# Patient Record
Sex: FEMALE | Race: BLACK OR AFRICAN AMERICAN | Employment: UNEMPLOYED | URBAN - METROPOLITAN AREA
[De-identification: names, ages, dates, MRNs, and addresses within clinical notes are randomized per-mention and may not be internally consistent; named-entity substitution may affect disease eponyms.]

---

## 2017-01-10 ENCOUNTER — HOSPITAL ENCOUNTER (EMERGENCY)
Age: 27
Discharge: HOME | End: 2017-01-10
Attending: EMERGENCY MEDICINE
Payer: COMMERCIAL

## 2017-01-10 ENCOUNTER — APPOINTMENT (OUTPATIENT)
Dept: GENERAL RADIOLOGY | Age: 27
End: 2017-01-10
Attending: PHYSICIAN ASSISTANT
Payer: COMMERCIAL

## 2017-01-10 VITALS
SYSTOLIC BLOOD PRESSURE: 140 MMHG | WEIGHT: 194.89 LBS | DIASTOLIC BLOOD PRESSURE: 87 MMHG | RESPIRATION RATE: 20 BRPM | BODY MASS INDEX: 32.47 KG/M2 | OXYGEN SATURATION: 94 % | TEMPERATURE: 97.9 F | HEART RATE: 92 BPM | HEIGHT: 65 IN

## 2017-01-10 DIAGNOSIS — R11.2 NON-INTRACTABLE VOMITING WITH NAUSEA, UNSPECIFIED VOMITING TYPE: ICD-10-CM

## 2017-01-10 DIAGNOSIS — K52.9 GASTROENTERITIS: Primary | ICD-10-CM

## 2017-01-10 LAB
ALBUMIN SERPL BCP-MCNC: 4 G/DL (ref 3.5–5)
ALBUMIN/GLOB SERPL: 0.8 {RATIO} (ref 1.1–2.2)
ALP SERPL-CCNC: 128 U/L (ref 45–117)
ALT SERPL-CCNC: 27 U/L (ref 12–78)
ANION GAP BLD CALC-SCNC: 6 MMOL/L (ref 5–15)
APPEARANCE UR: ABNORMAL
AST SERPL W P-5'-P-CCNC: 28 U/L (ref 15–37)
BACTERIA URNS QL MICRO: NEGATIVE /HPF
BASOPHILS # BLD AUTO: 0 K/UL (ref 0–0.1)
BASOPHILS # BLD: 0 % (ref 0–1)
BILIRUB SERPL-MCNC: 0.6 MG/DL (ref 0.2–1)
BILIRUB UR QL: NEGATIVE
BUN SERPL-MCNC: 7 MG/DL (ref 6–20)
BUN/CREAT SERPL: 7 (ref 12–20)
CALCIUM SERPL-MCNC: 9.2 MG/DL (ref 8.5–10.1)
CHLORIDE SERPL-SCNC: 106 MMOL/L (ref 97–108)
CO2 SERPL-SCNC: 30 MMOL/L (ref 21–32)
COLOR UR: ABNORMAL
CREAT SERPL-MCNC: 1 MG/DL (ref 0.55–1.02)
EOSINOPHIL # BLD: 0 K/UL (ref 0–0.4)
EOSINOPHIL NFR BLD: 0 % (ref 0–7)
EPITH CASTS URNS QL MICRO: ABNORMAL /LPF
ERYTHROCYTE [DISTWIDTH] IN BLOOD BY AUTOMATED COUNT: 15.3 % (ref 11.5–14.5)
GLOBULIN SER CALC-MCNC: 5.2 G/DL (ref 2–4)
GLUCOSE SERPL-MCNC: 102 MG/DL (ref 65–100)
GLUCOSE UR STRIP.AUTO-MCNC: NEGATIVE MG/DL
HCG UR QL: NEGATIVE
HCT VFR BLD AUTO: 39.6 % (ref 35–47)
HGB BLD-MCNC: 13.2 G/DL (ref 11.5–16)
HGB UR QL STRIP: ABNORMAL
HYALINE CASTS URNS QL MICRO: ABNORMAL /LPF (ref 0–5)
KETONES UR QL STRIP.AUTO: NEGATIVE MG/DL
LEUKOCYTE ESTERASE UR QL STRIP.AUTO: NEGATIVE
LIPASE SERPL-CCNC: 130 U/L (ref 73–393)
LYMPHOCYTES # BLD AUTO: 8 % (ref 12–49)
LYMPHOCYTES # BLD: 0.5 K/UL (ref 0.8–3.5)
MCH RBC QN AUTO: 26.7 PG (ref 26–34)
MCHC RBC AUTO-ENTMCNC: 33.3 G/DL (ref 30–36.5)
MCV RBC AUTO: 80 FL (ref 80–99)
MONOCYTES # BLD: 0.2 K/UL (ref 0–1)
MONOCYTES NFR BLD AUTO: 3 % (ref 5–13)
NEUTS SEG # BLD: 6 K/UL (ref 1.8–8)
NEUTS SEG NFR BLD AUTO: 89 % (ref 32–75)
NITRITE UR QL STRIP.AUTO: NEGATIVE
PH UR STRIP: 7 [PH] (ref 5–8)
PLATELET # BLD AUTO: 176 K/UL (ref 150–400)
POTASSIUM SERPL-SCNC: 4 MMOL/L (ref 3.5–5.1)
PROT SERPL-MCNC: 9.2 G/DL (ref 6.4–8.2)
PROT UR STRIP-MCNC: 100 MG/DL
RBC # BLD AUTO: 4.95 M/UL (ref 3.8–5.2)
RBC #/AREA URNS HPF: ABNORMAL /HPF (ref 0–5)
RBC MORPH BLD: ABNORMAL
SODIUM SERPL-SCNC: 142 MMOL/L (ref 136–145)
SP GR UR REFRACTOMETRY: 1.01 (ref 1–1.03)
UA: UC IF INDICATED,UAUC: ABNORMAL
UROBILINOGEN UR QL STRIP.AUTO: 0.2 EU/DL (ref 0.2–1)
WBC # BLD AUTO: 6.7 K/UL (ref 3.6–11)
WBC URNS QL MICRO: ABNORMAL /HPF (ref 0–4)

## 2017-01-10 PROCEDURE — 81001 URINALYSIS AUTO W/SCOPE: CPT | Performed by: EMERGENCY MEDICINE

## 2017-01-10 PROCEDURE — 74020 XR ABD FLAT/ ERECT: CPT

## 2017-01-10 PROCEDURE — 96361 HYDRATE IV INFUSION ADD-ON: CPT

## 2017-01-10 PROCEDURE — 74011250636 HC RX REV CODE- 250/636: Performed by: PHYSICIAN ASSISTANT

## 2017-01-10 PROCEDURE — 74011000250 HC RX REV CODE- 250: Performed by: PHYSICIAN ASSISTANT

## 2017-01-10 PROCEDURE — 99284 EMERGENCY DEPT VISIT MOD MDM: CPT

## 2017-01-10 PROCEDURE — 83690 ASSAY OF LIPASE: CPT

## 2017-01-10 PROCEDURE — 74011250637 HC RX REV CODE- 250/637: Performed by: PHYSICIAN ASSISTANT

## 2017-01-10 PROCEDURE — 85025 COMPLETE CBC W/AUTO DIFF WBC: CPT | Performed by: EMERGENCY MEDICINE

## 2017-01-10 PROCEDURE — 80053 COMPREHEN METABOLIC PANEL: CPT | Performed by: EMERGENCY MEDICINE

## 2017-01-10 PROCEDURE — 36415 COLL VENOUS BLD VENIPUNCTURE: CPT | Performed by: EMERGENCY MEDICINE

## 2017-01-10 PROCEDURE — 96374 THER/PROPH/DIAG INJ IV PUSH: CPT

## 2017-01-10 PROCEDURE — 81025 URINE PREGNANCY TEST: CPT | Performed by: EMERGENCY MEDICINE

## 2017-01-10 RX ORDER — SODIUM CHLORIDE 0.9 % (FLUSH) 0.9 %
5-10 SYRINGE (ML) INJECTION AS NEEDED
Status: DISCONTINUED | OUTPATIENT
Start: 2017-01-10 | End: 2017-01-10 | Stop reason: HOSPADM

## 2017-01-10 RX ORDER — AZATHIOPRINE 50 MG/1
50 TABLET ORAL DAILY
COMMUNITY
End: 2021-03-15

## 2017-01-10 RX ORDER — ONDANSETRON 4 MG/1
4 TABLET, ORALLY DISINTEGRATING ORAL
Status: DISCONTINUED | OUTPATIENT
Start: 2017-01-10 | End: 2017-01-10

## 2017-01-10 RX ORDER — RANITIDINE HCL 75 MG
75 TABLET ORAL 2 TIMES DAILY
Qty: 14 TAB | Refills: 0 | Status: SHIPPED | OUTPATIENT
Start: 2017-01-10 | End: 2021-03-15

## 2017-01-10 RX ORDER — ONDANSETRON 2 MG/ML
4 INJECTION INTRAMUSCULAR; INTRAVENOUS
Status: COMPLETED | OUTPATIENT
Start: 2017-01-10 | End: 2017-01-10

## 2017-01-10 RX ORDER — ONDANSETRON 4 MG/1
4 TABLET, FILM COATED ORAL
Qty: 15 TAB | Refills: 0 | Status: SHIPPED | OUTPATIENT
Start: 2017-01-10 | End: 2018-09-29

## 2017-01-10 RX ORDER — SODIUM CHLORIDE 0.9 % (FLUSH) 0.9 %
5-10 SYRINGE (ML) INJECTION EVERY 8 HOURS
Status: DISCONTINUED | OUTPATIENT
Start: 2017-01-10 | End: 2017-01-10 | Stop reason: HOSPADM

## 2017-01-10 RX ADMIN — SODIUM CHLORIDE 1000 ML: 900 INJECTION, SOLUTION INTRAVENOUS at 14:55

## 2017-01-10 RX ADMIN — LIDOCAINE HYDROCHLORIDE 40 ML: 20 SOLUTION ORAL; TOPICAL at 15:28

## 2017-01-10 RX ADMIN — ONDANSETRON 4 MG: 2 INJECTION INTRAMUSCULAR; INTRAVENOUS at 14:55

## 2017-01-10 NOTE — ED NOTES
MP Yu visited pt. Discharge orders and instructions noted. Discharge instructions, follow up care and prescriptions reviewed with pt and understanding verbalized. Opportunity for questions and clarifications provided. Pt left via ambulation with family members. In no acute distress.

## 2017-01-10 NOTE — ED NOTES
Patient brought back to triage for re-eval, pt updated on plan of care; pt reports pain continues with nausea

## 2017-01-10 NOTE — Clinical Note
[facility] [address] [place] [address] Physicians [phone] Nora Lofts, MD Cecillia Cera, MD Ritta Scheuermann, MD 
 
[facility] [phone] Diamond Hoffman, P MD Marco Tilley MD Pinkie Levers, MD [phone] MD Jean Romero, MD  
 
[facility] [phone] MD Mateusz Jones, MD Jeffrey Triplett MD [facility] [phone] Morton Opitz, MD Delmar Colder, MD Parish Crowell, NP [address] [phone] MD Laura Orellana MD Darrol Coco, MD Juhi Rendon, MD Yuniel Zimmerman, MD Amanda Bermudez, MD Leandra Young, [address] [place] [address] [phone] Cady Knowles MD 
 
[facility] [phone] MD Annie Yates, NP Yumi Salazar, MD Steph Stoddard MD Faith Memo, MD  
 
[facility] [phone] Cece Rodas, MD Danitza Quintanilla, P Loretta Hurst, NP  
Marylen Delude, MD Gwynne Downy, MD Clifm Beals, MD Lauris Ganja, MD 
 
[facility] [phone] MD Verito Huizar MD Earline Sees, MD Benedict Rector, MD Jocelyne Guys, MD  
 
[facility] [phone] MD Sarwat Godinez MD 
 
 [facility] [phone] MD Moo Deleon MD Shelton Payment, MD  
 
[facility] [phone] Malini Cadet, MD  
Soraya Marcos, MD  
MD Hamilton Weiner MD Lennon Birchwood, MD Marlynn Fiddler, SHYAM Zhao MD  
 
[phone] MD Roseanna Reynolds MD [facility] MD John Johns, DALLIN Rodriguez FNP Glyn Basques, PA-C Princeton Pack, MD Dory Antonio, SHYAM Hodge, [facility] Departments For adult and child immunizations, family planning, TB screening, STD testing and women's health services. [place]: 
[place]  [phone] [place]  [phone] [address] [place] [address]  [phone] [place]   [phone] J[facility]   [phone] [place]: 
[facility]  [phone] [place] [phone] [place]  [phone] [address]  For primary care servi [place], woman and child wellness, and some clinics providing specialty care. [facility] -- [facility] [facility].  
[address] [place] [address]  [phone] [facility] 200 [facility] [phone] [facility] [address]   [phone] [address] [place] [address] Drive [address] [place] [address]    [phone] [facility] [address]  [phone] [address]  [phone] Count includes the [facility] [address], [address] [place] [address] [facility]: 
[facility] [address], ext [phone] [facility], [address] [phone] [place] [address] [phone] [place] [address] Mindy [facility] [address], [phone] [facility]8 [facility] ([email or web address]) [phone] Sexual Health/Woman Wellness Clinics For STD/HIV testing and treatment, pregnancy testing and services, men's health, birth cont rol services, LGBT services, and hepatitis/HPV vaccine services. Brennon [facility] Belinda for [place] [employer] [address] [phone] [facility] of [address]  [phone] [address] [place] [phone] [facility]' 401 [facility], 5th floor [phone] [facility] of [facility] 427 [facility] [address] [facility] [address]. Ramiro Garvey [phone] Specialty Service Clinics 4200 [facility] [phone] 6655 [facility]   [phone] [employer]   [phone] Women, Infant and Children's Services:  
[address] [phone] [address] [phone] 6161 [facility]- [phone] [address] [facility] Psych[facility]     [phone] 1325 [facility]   [phone] 562 [facility] [phone] Miscellaneous: Thank you for allowing us to provide y ou with excellent care today. We hope we addressed all of your concerns and needs. We strive to provide excellent quality care in the Emergency Department. Please rate us as excellent, as anything less than excellent does not meet our expectations. If  you feel that you have not received excellent quality care or timely care, please ask to speak to the nurse manager. Please choose us in the future for your continued health care needs. The exam and treatment you received in the Emergency Department  were for an urgent problem and are not intended as complete care. It is important that you follow up with a doctor, nurse practitioner, or physician assistant for ongoing care.  If your symptoms become worse or you do not improve as expected and you are u delores to reach your usual health care provider, you should return to the Emergency Department. We are available 24 hours a day. Take this sheet with you when you go to your follow-up visit. If you have any problem arranging the follow-up visit, conta ct the Emergency Department immediately. If a prescription has been provided, please have it filled as soon as possible to avoid a delay in treatment. Read the entire medication instruction sheet provided to you by the pharmacy. If you have any questio ns or reservations about taking the medication due to side effects or interactions with other medications please call the ER or your primary care physician. Take this sheet with you when you go to your follow-up visit. Make an appointment with yo  family doctor or the physician you were referred to for follow-up of this visit, as this is mandatory follow-up. Return to the ER if you are unable to be seen or if you are unable to be seen in a timely manner. If you have any problem arranging th e follow-up visit, contact the Emergency Department immediately.

## 2017-01-10 NOTE — DISCHARGE INSTRUCTIONS
Gastroenteritis: Care Instructions  Your Care Instructions  Gastroenteritis is an illness that may cause nausea, vomiting, and diarrhea. It is sometimes called \"stomach flu. \" It can be caused by bacteria or a virus. You will probably begin to feel better in 1 to 2 days. In the meantime, get plenty of rest and make sure you do not become dehydrated. Dehydration occurs when your body loses too much fluid. Follow-up care is a key part of your treatment and safety. Be sure to make and go to all appointments, and call your doctor if you are having problems. Its also a good idea to know your test results and keep a list of the medicines you take. How can you care for yourself at home? · If your doctor prescribed antibiotics, take them as directed. Do not stop taking them just because you feel better. You need to take the full course of antibiotics. · Drink plenty of fluids to prevent dehydration, enough so that your urine is light yellow or clear like water. Choose water and other caffeine-free clear liquids until you feel better. If you have kidney, heart, or liver disease and have to limit fluids, talk with your doctor before you increase your fluid intake. · Drink fluids slowly, in frequent, small amounts, because drinking too much too fast can cause vomiting. · Begin eating mild foods, such as dry toast, yogurt, applesauce, bananas, and rice. Avoid spicy, hot, or high-fat foods, and do not drink alcohol or caffeine for a day or two. Do not drink milk or eat ice cream until you are feeling better. How to prevent gastroenteritis  · Keep hot foods hot and cold foods cold. · Do not eat meats, dressings, salads, or other foods that have been kept at room temperature for more than 2 hours. · Use a thermometer to check your refrigerator. It should be between 34°F and 40°F.  · Defrost meats in the refrigerator or microwave, not on the kitchen counter. · Keep your hands and your kitchen clean.  Wash your hands, cutting boards, and countertops with hot soapy water frequently. · Cook meat until it is well done. · Do not eat raw eggs or uncooked sauces made with raw eggs. · Do not take chances. If food looks or tastes spoiled, throw it out. When should you call for help? Call 911 anytime you think you may need emergency care. For example, call if:  · You vomit blood or what looks like coffee grounds. · You passed out (lost consciousness). · You pass maroon or very bloody stools. Call your doctor now or seek immediate medical care if:  · You have severe belly pain. · You have signs of needing more fluids. You have sunken eyes, a dry mouth, and pass only a little dark urine. · You feel like you are going to faint. · You have increased belly pain that does not go away in 1 to 2 days. · You have new or increased nausea, or you are vomiting. · You have a new or higher fever. · Your stools are black and tarlike or have streaks of blood. Watch closely for changes in your health, and be sure to contact your doctor if:  · You are dizzy or lightheaded. · You urinate less than usual, or your urine is dark yellow or brown. · You do not feel better with each day that goes by. Where can you learn more? Go to http://brown-katie.info/. Enter N142 in the search box to learn more about \"Gastroenteritis: Care Instructions. \"  Current as of: May 24, 2016  Content Version: 11.1  © 2891-5942 [employer]. Care instructions adapted under license by [employer] (which disclaims liability or warranty for this information). If you have questions about a medical condition or this instruction, always ask your healthcare professional. [place] [address] any warranty or liability for your use of this information. Thank you for allowing us to provide you with excellent care today. We hope we addressed all of your concerns and needs.  We strive to provide excellent quality care in the Emergency Department. Please rate us as excellent, as anything less than excellent does not meet our expectations. If you feel that you have not received excellent quality care or timely care, please ask to speak to the nurse manager. Please choose us in the future for your continued health care needs. The exam and treatment you received in the Emergency Department were for an urgent problem and are not intended as complete care. It is important that you follow-up with a doctor, nurse practitioner, or physician assistant to:  (1) confirm your diagnosis,  (2) re-evaluation of changes in your illness and treatment, and  (3) for ongoing care. If your symptoms become worse or you do not improve as expected and you are unable to reach your usual health care provider, you should return to the Emergency Department. We are available 24 hours a day. Take this sheet with you when you go to your follow-up visit. If you have any problem arranging the follow-up visit, contact [address] [place] [address] 21 [phone])    Make an appointment with your Primary Care doctor for follow up of this visit. Return to the ER if you are unable to be seen in the time recommended on your discharge instructions.

## 2017-01-10 NOTE — ED PROVIDER NOTES
HPI Comments: Debra Leon is a 31 y.o. female with a hx of sarcoidosis presenting to the ED from home c/o mid epigastric abdominal that began this AM with nausea and vomiting. Patient states that the pain is localized to her mid epigastric area and does not radiate anywhere else. She reports that she has vomited several times this AM, with the emesis a yellowish clear color. She denies diarrhea, but reports that she has not had a bowel movement for the past 4 days. She reports that she has had pain like this in the past, stating that once it was related to her spleen and the other time she was pregnant. She states that her LMP was 12/24. Denies fevers, chills, chest pain, SOB, rash, or headache. Denies recent sick contacts. Patient states the only medication she takes currently is Azathioprine. Patient denies any other symptoms or complaints. There are no other complaints, changes or physical findings at this time. The history is provided by the patient. No  was used. Past Medical History:   Diagnosis Date    Sarcoidosis of lung ([facility] [address]      diagnosed 2005       History reviewed. No pertinent past surgical history. History reviewed. No pertinent family history. Social History     Social History    Marital status: SINGLE     Spouse name: N/A    Number of children: N/A    Years of education: N/A     Occupational History    Not on file. Social History Main Topics    Smoking status: Never Smoker    Smokeless tobacco: Not on file    Alcohol use No    Drug use: No    Sexual activity: Yes     Partners: Male     Birth control/ protection: None     Other Topics Concern    Not on file     Social History Narrative         ALLERGIES: Review of patient's allergies indicates no known allergies. Review of Systems   Constitutional: Negative for chills and fever. HENT: Negative for sore throat. Eyes: Negative for pain.    Respiratory: Negative for cough and shortness of breath. Cardiovascular: Negative for chest pain. Gastrointestinal: Positive for abdominal pain (mid epigastric), nausea and vomiting. Negative for diarrhea. Genitourinary: Negative for difficulty urinating, dysuria and hematuria. Musculoskeletal: Negative for arthralgias and myalgias. Skin: Negative for rash. Neurological: Negative for dizziness, weakness, light-headedness, numbness and headaches. Vitals:    01/10/17 1229 01/10/17 1315   BP: 113/70 140/87   Pulse: 87 92   Resp: 18 20   Temp: 97.9 °F (36.6 °C)    SpO2: 97% 94%   Weight: 88.4 kg (194 lb 14.2 oz)    Height: 5' 5\" (1.651 m)             Physical Exam   Constitutional: She is oriented to person, place, and time. She appears well-developed and well-nourished. No distress. 32 y/o -American    HENT:   Head: Normocephalic and atraumatic. Right Ear: External ear normal.   Left Ear: External ear normal.   Eyes: Conjunctivae and EOM are normal.   Neck: Normal range of motion. Neck supple. Cardiovascular: Normal rate, regular rhythm and normal heart sounds. No murmur heard. Pulmonary/Chest: Effort normal and breath sounds normal. No respiratory distress. She has no wheezes. She has no rales. She exhibits no tenderness. Abdominal: Soft. Bowel sounds are normal. She exhibits no distension and no ascites. There is no rigidity, no rebound, no guarding, no CVA tenderness and negative Infante's sign. Mid epigastric tenderness to deep palpation. Musculoskeletal: Normal range of motion. She exhibits no edema or tenderness. Neurological: She is alert and oriented to person, place, and time. Skin: Skin is warm and dry. No rash noted. She is not diaphoretic. Psychiatric: She has a normal mood and affect. Her behavior is normal. Judgment normal.   Nursing note and vitals reviewed.        MDM  Number of Diagnoses or Management Options  Gastroenteritis:   Non-intractable vomiting with nausea, unspecified vomiting type: Diagnosis management comments: DDx: gastroenteritis, pancreatitis, hepatitis, cholecystitis, cholelithiasis, UTI, nephrolithiasis, gastritis, peptic ulcer disease. 32 y/o AAF presenting with midepigastric pain with vomiting since this AM. She states that she has vomited yellow emesis several times since this AM. Last BM 4 days ago. Exam reassuring with mild mid epigastric tenderness on palpation. Labs reassuring. Patient had no abdominal pain on reevaluation. Discussed results and plan for discharge. Patient agrees to plan. Amount and/or Complexity of Data Reviewed  Clinical lab tests: ordered and reviewed  Tests in the radiology section of CPT®: ordered and reviewed    Critical Care  Total time providing critical care: < 30 minutes    Patient Progress  Patient progress: stable      Procedures    PROGRESS NOTE:  3:38 PM  Reevaluated patient and she is no longer having any abdominal pain. Discussed with patient that her symptoms are likely related to viral gastroenteritis. Will offer supportive treatment. Patient understanding and agrees with plan for discharge once fluids are finished. DISCHARGE NOTE:  4:35 PM  The care plan has been outline with the patient and/or family, who verbally conveyed understanding and agreement. Available results have been reviewed. Patient and/or family understand the follow up plan as outlined and discharge instructions. Should their condition deterioration at any time after discharge the patient agrees to return, follow up sooner than outlined or seek medical assistance at the closest Emergency Room as soon as possible. Questions have been answered.  Discharge instructions and educational information regarding the patient's diagnosis as well a list of reasons why the patient would want to seek immediate medical attention, should their condition change, were reviewed directly with the patient/family     LABS COMPLETED AND REVIEWED:  Recent Results (from the past 12 hour(s))   CBC WITH AUTOMATED DIFF    Collection Time: 01/10/17 12:32 PM   Result Value Ref Range    WBC 6.7 3.6 - 11.0 K/uL    RBC 4.95 3.80 - 5.20 M/uL    HGB 13.2 11.5 - 16.0 g/dL    HCT 39.6 35.0 - 47.0 %    MCV 80.0 80.0 - 99.0 FL    MCH 26.7 26.0 - 34.0 PG    MCHC 33.3 30.0 - 36.5 g/dL    RDW 15.3 (H) 11.5 - 14.5 %    PLATELET 924 432 - 102 K/uL    NEUTROPHILS 89 (H) 32 - 75 %    LYMPHOCYTES 8 (L) 12 - 49 %    MONOCYTES 3 (L) 5 - 13 %    EOSINOPHILS 0 0 - 7 %    BASOPHILS 0 0 - 1 %    ABS. NEUTROPHILS 6.0 1.8 - 8.0 K/UL    ABS. LYMPHOCYTES 0.5 (L) 0.8 - 3.5 K/UL    ABS. MONOCYTES 0.2 0.0 - 1.0 K/UL    ABS. EOSINOPHILS 0.0 0.0 - 0.4 K/UL    ABS. BASOPHILS 0.0 0.0 - 0.1 K/UL    RBC COMMENTS NORMOCYTIC, NORMOCHROMIC     METABOLIC PANEL, COMPREHENSIVE    Collection Time: 01/10/17 12:32 PM   Result Value Ref Range    Sodium 142 136 - 145 mmol/L    Potassium 4.0 3.5 - 5.1 mmol/L    Chloride 106 97 - 108 mmol/L    CO2 30 21 - 32 mmol/L    Anion gap 6 5 - 15 mmol/L    Glucose 102 (H) 65 - 100 mg/dL    BUN 7 6 - 20 MG/DL    Creatinine 1.00 0.55 - 1.02 MG/DL    BUN/Creatinine ratio 7 (L) 12 - 20      GFR est AA >60 >60 ml/min/1.73m2    GFR est non-AA >60 >60 ml/min/1.73m2    Calcium 9.2 8.5 - 10.1 MG/DL    Bilirubin, total 0.6 0.2 - 1.0 MG/DL    ALT 27 12 - 78 U/L    AST 28 15 - 37 U/L    Alk.  phosphatase 128 (H) 45 - 117 U/L    Protein, total 9.2 (H) 6.4 - 8.2 g/dL    Albumin 4.0 3.5 - 5.0 g/dL    Globulin 5.2 (H) 2.0 - 4.0 g/dL    A-G Ratio 0.8 (L) 1.1 - 2.2     LIPASE    Collection Time: 01/10/17 12:32 PM   Result Value Ref Range    Lipase 130 73 - 393 U/L   URINALYSIS W/ REFLEX CULTURE    Collection Time: 01/10/17  1:10 PM   Result Value Ref Range    Color YELLOW/STRAW      Appearance CLOUDY (A) CLEAR      Specific gravity 1.010 1.003 - 1.030      pH (UA) 7.0 5.0 - 8.0      Protein 100 (A) NEG mg/dL    Glucose NEGATIVE  NEG mg/dL    Ketone NEGATIVE  NEG mg/dL    Bilirubin NEGATIVE  NEG      Blood SMALL (A) NEG Urobilinogen 0.2 0.2 - 1.0 EU/dL    Nitrites NEGATIVE  NEG      Leukocyte Esterase NEGATIVE  NEG      WBC 0-4 0 - 4 /hpf    RBC 0-5 0 - 5 /hpf    Epithelial cells FEW FEW /lpf    Bacteria NEGATIVE  NEG /hpf    UA:UC IF INDICATED CULTURE NOT INDICATED BY UA RESULT CNI      Hyaline Cast 0-2 0 - 5 /lpf   HCG URINE, QL    Collection Time: 01/10/17  1:10 PM   Result Value Ref Range    HCG urine, Ql. NEGATIVE  NEG         IMAGING COMPLETED AND REVIEWED:  Abd Xray  FINDINGS: Supine and upright views of the abdomen demonstrate a normal gas  pattern. There is no free intraperitoneal air. No soft tissue masses or  pathologic calcifications are seen. The bones and soft tissues are within normal  limits.     IMPRESSION: Normal abdomen. CLINICAL IMPRESSION:  1. Gastroenteritis    2. Non-intractable vomiting with nausea, unspecified vomiting type        Plan:  Discharge Medication List as of 1/10/2017  4:24 PM      START taking these medications    Details   ondansetron hcl (ZOFRAN, AS HYDROCHLORIDE,) 4 mg tablet Take 1 Tab by mouth every eight (8) hours as needed for Nausea. , Print, Disp-15 Tab, R-0      raNITIdine (ZANTAC) 75 mg tablet Take 1 Tab by mouth two (2) times a day., Print, Disp-14 Tab, R-0         CONTINUE these medications which have NOT CHANGED    Details   azaTHIOprine (IMURAN) 50 mg tablet Take 50 mg by mouth daily. , Historical Med           Follow-up Information     Follow up With Details Comments Contact Info    [facility] EMERGENCY DEPT  As needed, If symptoms worsen [address] [phone]        Return to the closest emergency room or follow up sooner for any deterioration      Thank you for allowing us to provide you with excellent care today. We hope we addressed all of your concerns and needs. We strive to provide excellent quality care in the Emergency Department. Please rate us as excellent, as anything less than excellent does not meet our expectations.      If you feel that you have not received excellent quality care or timely care, please ask to speak to the nurse manager. Please choose us in the future for your continued health care needs. The exam and treatment you received in the Emergency Department were for an urgent problem and are not intended as complete care. It is important that you follow-up with a doctor, nurse practitioner, or physician assistant to:  (1) confirm your diagnosis,  (2) re-evaluation of changes in your illness and treatment, and  (3) for ongoing care. If your symptoms become worse or you do not improve as expected and you are unable to reach your usual health care provider, you should return to the Emergency Department. We are available 24 hours a day. Take this sheet with you when you go to your follow-up visit. If you have any problem arranging the follow-up visit, contact [address] [place] [address] 21 [phone])    Make an appointment with your Primary Care doctor for follow up of this visit. Return to the ER if you are unable to be seen in the time recommended on your discharge instructions.

## 2017-03-08 ENCOUNTER — HOSPITAL ENCOUNTER (EMERGENCY)
Age: 27
Discharge: HOME | End: 2017-03-08
Attending: EMERGENCY MEDICINE
Payer: COMMERCIAL

## 2017-03-08 VITALS
RESPIRATION RATE: 18 BRPM | WEIGHT: 188 LBS | SYSTOLIC BLOOD PRESSURE: 125 MMHG | HEIGHT: 65 IN | DIASTOLIC BLOOD PRESSURE: 69 MMHG | TEMPERATURE: 98.9 F | BODY MASS INDEX: 31.32 KG/M2 | OXYGEN SATURATION: 97 % | HEART RATE: 106 BPM

## 2017-03-08 DIAGNOSIS — J20.9 ACUTE BRONCHITIS, UNSPECIFIED ORGANISM: Primary | ICD-10-CM

## 2017-03-08 PROCEDURE — 77030013140 HC MSK NEB VYRM -A

## 2017-03-08 PROCEDURE — 99283 EMERGENCY DEPT VISIT LOW MDM: CPT

## 2017-03-08 PROCEDURE — 74011636637 HC RX REV CODE- 636/637: Performed by: EMERGENCY MEDICINE

## 2017-03-08 PROCEDURE — 74011000250 HC RX REV CODE- 250: Performed by: EMERGENCY MEDICINE

## 2017-03-08 PROCEDURE — 94640 AIRWAY INHALATION TREATMENT: CPT

## 2017-03-08 RX ORDER — ALBUTEROL SULFATE 90 UG/1
2 AEROSOL, METERED RESPIRATORY (INHALATION)
Qty: 1 INHALER | Refills: 1 | Status: SHIPPED | OUTPATIENT
Start: 2017-03-08 | End: 2021-03-15

## 2017-03-08 RX ORDER — IPRATROPIUM BROMIDE AND ALBUTEROL SULFATE 2.5; .5 MG/3ML; MG/3ML
3 SOLUTION RESPIRATORY (INHALATION)
Status: COMPLETED | OUTPATIENT
Start: 2017-03-08 | End: 2017-03-08

## 2017-03-08 RX ORDER — PREDNISONE 20 MG/1
20 TABLET ORAL DAILY
Qty: 5 TAB | Refills: 0 | Status: SHIPPED | OUTPATIENT
Start: 2017-03-08 | End: 2017-03-13

## 2017-03-08 RX ORDER — HYDROCODONE POLISTIREX AND CHLORPHENIRAMINE POLISTIREX 10; 8 MG/5ML; MG/5ML
5 SUSPENSION, EXTENDED RELEASE ORAL
Qty: 60 ML | Refills: 0 | Status: SHIPPED | OUTPATIENT
Start: 2017-03-08 | End: 2017-08-11

## 2017-03-08 RX ORDER — PREDNISONE 20 MG/1
60 TABLET ORAL
Status: COMPLETED | OUTPATIENT
Start: 2017-03-08 | End: 2017-03-08

## 2017-03-08 RX ADMIN — PREDNISONE 60 MG: 20 TABLET ORAL at 10:34

## 2017-03-08 RX ADMIN — IPRATROPIUM BROMIDE AND ALBUTEROL SULFATE 3 ML: .5; 3 SOLUTION RESPIRATORY (INHALATION) at 10:16

## 2017-03-08 NOTE — DISCHARGE INSTRUCTIONS
Bronchitis: Care Instructions  Your Care Instructions    Bronchitis is inflammation of the bronchial tubes, which carry air to the lungs. The tubes swell and produce mucus, or phlegm. The mucus and inflamed bronchial tubes make you cough. You may have trouble breathing. Most cases of bronchitis are caused by viruses like those that cause colds. Antibiotics usually do not help and they may be harmful. Bronchitis usually develops rapidly and lasts about 2 to 3 weeks in otherwise healthy people. Follow-up care is a key part of your treatment and safety. Be sure to make and go to all appointments, and call your doctor if you are having problems. It's also a good idea to know your test results and keep a list of the medicines you take. How can you care for yourself at home? · Take all medicines exactly as prescribed. Call your doctor if you think you are having a problem with your medicine. · Get some extra rest.  · Take an over-the-counter pain medicine, such as acetaminophen (Tylenol), ibuprofen (Advil, Motrin), or naproxen (Aleve) to reduce fever and relieve body aches. Read and follow all instructions on the label. · Do not take two or more pain medicines at the same time unless the doctor told you to. Many pain medicines have acetaminophen, which is Tylenol. Too much acetaminophen (Tylenol) can be harmful. · Take an over-the-counter cough medicine that contains dextromethorphan to help quiet a dry, hacking cough so that you can sleep. Avoid cough medicines that have more than one active ingredient. Read and follow all instructions on the label. · Breathe moist air from a humidifier, hot shower, or sink filled with hot water. The heat and moisture will thin mucus so you can cough it out. · Do not smoke. Smoking can make bronchitis worse. If you need help quitting, talk to your doctor about stop-smoking programs and medicines. These can increase your chances of quitting for good.   When should you call for help? Call 911 anytime you think you may need emergency care. For example, call if:  · You have severe trouble breathing. Call your doctor now or seek immediate medical care if:  · You have new or worse trouble breathing. · You cough up dark brown or bloody mucus (sputum). · You have a new or higher fever. · You have a new rash. Watch closely for changes in your health, and be sure to contact your doctor if:  · You cough more deeply or more often, especially if you notice more mucus or a change in the color of your mucus. · You are not getting better as expected. Where can you learn more? Go to http://brown-katie.info/. Enter H333 in the search box to learn more about \"Bronchitis: Care Instructions. \"  Current as of: May 23, 2016  Content Version: 11.1  © 6855-8486 [employer]. Care instructions adapted under license by [employer] (which disclaims liability or warranty for this information). If you have questions about a medical condition or this instruction, always ask your healthcare professional. [address] any warranty or liability for your use of this information.

## 2017-03-08 NOTE — ED NOTES
Pt tolerated neb treatment and med's. Pt accepted D/C data and left unit steady gait. Pt refuse W/C for D/C. Patient (s)  given copy of dc instructions and 3 script(s). Patient(s)  verbalized understanding of instructions and script (s). Patient given a current medication reconciliation form and verbalized understanding of their medications. Patient (s) verbalized understanding of the importance of discussing medications with  his or her physician or clinic when they follow up. Patient alert and oriented and in no acute distress. Pt verbalizes pain scale of 0 out of 10. Patient discharged home ambulatory with self.

## 2017-03-08 NOTE — ED NOTES
According to pt + coughing non productive with scattered wheezing x 2 days. Dr. Mehdi Tracy at bedside evaluating patient. Emergency Department Nursing Plan of Care       The Nursing Plan of Care is developed from the Nursing assessment and Emergency Department Attending provider initial evaluation. The plan of care may be reviewed in the ED Provider note.     The Plan of Care was developed with the following considerations:   Patient / Family readiness to learn indicated by:verbalized understanding  Persons(s) to be included in education: patient  Barriers to Learning/Limitations:No    Signed     Dylan Iverson RN    3/8/2017   10:23 AM

## 2017-03-08 NOTE — ED PROVIDER NOTES
HPI Comments: Annamaria Alicea is a 31 y.o. female with PMHx significant for sarcoidosis of lung presenting ambulatory to [facility] ED with c/o SOB that began a couple of days ago. The pt notes additional sx of a dry cough that began a few days ago. The pt states that she has a Hx of sarcoidosis and reports taking her medications on time. The pt notes that her last known menstrual cycle was on 02/17/2017. She reports using an inhaler a couple of years ago but denies having use one since then. The pt denies fever, n/v/d and chest pain. PCP: None  Social History:  (-) Tobacco (-),   (-) EtOH,      (-) Drugs     There are no other complaints, changes, or physical findings at this time. This note is prepared by Olga Lara, acting as Scribe for Peewee Harkins MD.    Peewee Harkins MD: The scribe's documentation has been prepared under my direction and personally reviewed by me in its entirety. I confirm that the note above accurately reflects all work, treatment, procedures, and medical decision making performed by me. The history is provided by the patient. Past Medical History:   Diagnosis Date    Sarcoidosis of lung ([facility] [address]     diagnosed 2005       No past surgical history on file. No family history on file. Social History     Social History    Marital status: SINGLE     Spouse name: N/A    Number of children: N/A    Years of education: N/A     Occupational History    Not on file. Social History Main Topics    Smoking status: Never Smoker    Smokeless tobacco: Not on file    Alcohol use No    Drug use: No    Sexual activity: Yes     Partners: Male     Birth control/ protection: None     Other Topics Concern    Not on file     Social History Narrative         ALLERGIES: Review of patient's allergies indicates no known allergies. Review of Systems   Constitutional: Negative. Negative for appetite change, chills and fever. HENT: Negative. Negative for congestion.     Eyes: Negative for visual disturbance. Respiratory: Positive for cough and shortness of breath. Negative for wheezing. Cardiovascular: Negative for chest pain, palpitations and leg swelling. Gastrointestinal: Negative for abdominal pain, diarrhea, nausea and vomiting. Genitourinary: Negative. Negative for dysuria, frequency and urgency. Musculoskeletal: Negative for back pain, joint swelling, myalgias and neck stiffness. Skin: Negative. Negative for rash. Neurological: Negative. Negative for dizziness, syncope, weakness and headaches. Hematological: Negative for adenopathy. Psychiatric/Behavioral: Negative for behavioral problems and dysphoric mood. All other systems reviewed and are negative. Vitals:    03/08/17 0945   BP: 125/69   Pulse: (!) 106   Resp: 18   Temp: 98.9 °F (37.2 °C)   SpO2: 97%   Weight: 85.3 kg (188 lb)   Height: 5' 5\" (1.651 m)            Physical Exam   Constitutional: She is oriented to person, place, and time. She appears well-developed and well-nourished. No distress. HENT:   Head: Normocephalic and atraumatic. Mouth/Throat: Oropharynx is clear and moist.   Eyes: Conjunctivae and EOM are normal. Pupils are equal, round, and reactive to light. No scleral icterus. Neck: Normal range of motion. Neck supple. Cardiovascular: Normal rate, regular rhythm and normal heart sounds. Exam reveals no gallop. No murmur heard. Pulmonary/Chest: Effort normal. No stridor. No respiratory distress. She has wheezes (expiratory bilaterally). She has rhonchi (scattered). She has no rales. Abdominal: Soft. Bowel sounds are normal. She exhibits no distension and no mass. There is no tenderness. There is no rebound and no guarding. Musculoskeletal: Normal range of motion. She exhibits no edema. Lymphadenopathy:     She has no cervical adenopathy. Neurological: She is alert and oriented to person, place, and time. No cranial nerve deficit.  Coordination normal.   Skin: Skin is warm and dry. No rash noted. No erythema. Psychiatric: She has a normal mood and affect. Nursing note and vitals reviewed. MDM  Number of Diagnoses or Management Options  Acute bronchitis, unspecified organism:   Diagnosis management comments:   DDx: Bronchitis, PNA, sarcoidosis exacerbation        Amount and/or Complexity of Data Reviewed  Review and summarize past medical records: yes    Patient Progress  Patient progress: stable    ED Course       Procedures    MEDICATIONS GIVEN:  Medications   predniSONE (DELTASONE) tablet 60 mg (not administered)   albuterol-ipratropium (DUO-NEB) 2.5 MG-0.5 MG/3 ML (3 mL Nebulization Given 3/8/17 1016)       IMPRESSION:  1. Acute bronchitis, unspecified organism        PLAN:  1. Current Discharge Medication List      START taking these medications    Details   albuterol (PROVENTIL HFA, VENTOLIN HFA, PROAIR HFA) 90 mcg/actuation inhaler Take 2 Puffs by inhalation every four (4) hours as needed for Wheezing. Qty: 1 Inhaler, Refills: 1      predniSONE (DELTASONE) 20 mg tablet Take 1 Tab by mouth daily for 5 days. With Breakfast  Qty: 5 Tab, Refills: 0      chlorpheniramine-HYDROcodone (TUSSIONEX PENNKINETIC ER) 10-8 mg/5 mL suspension Take 5 mL by mouth every twelve (12) hours as needed for Cough. Max Daily Amount: 10 mL. Qty: 60 mL, Refills: 0         CONTINUE these medications which have NOT CHANGED    Details   azaTHIOprine (IMURAN) 50 mg tablet Take 50 mg by mouth daily. ondansetron hcl (ZOFRAN, AS HYDROCHLORIDE,) 4 mg tablet Take 1 Tab by mouth every eight (8) hours as needed for Nausea. Qty: 15 Tab, Refills: 0      raNITIdine (ZANTAC) 75 mg tablet Take 1 Tab by mouth two (2) times a day. Qty: 14 Tab, Refills: 0           2.    Follow-up Information     Follow up With Details Comments [address] In 1 week  [address] [facility] [id number] [phone]        Return to ED if worse     DISCHARGE NOTE  10:19 AM  The patient has been re-evaluated and is ready for discharge. Reviewed available results with patient. Counseled pt on diagnosis and care plan. Pt has expressed understanding, and all questions have been answered. Pt agrees with plan and agrees to F/U as recommended, or return to the ED if their sxs worsen. Discharge instructions have been provided and explained to the pt, along with reasons to return to the ED. This note is prepared by Olga Lara, acting as Scribe for Peewee Harkins MD.    Peewee Harkins MD: The scribe's documentation has been prepared under my direction and personally reviewed by me in its entirety. I confirm that the note above accurately reflects all work, treatment, procedures, and medical decision making performed by me.

## 2017-06-29 ENCOUNTER — HOSPITAL ENCOUNTER (EMERGENCY)
Age: 27
Discharge: HOME | End: 2017-06-29
Attending: INTERNAL MEDICINE | Admitting: INTERNAL MEDICINE
Payer: COMMERCIAL

## 2017-06-29 VITALS
TEMPERATURE: 97.3 F | SYSTOLIC BLOOD PRESSURE: 118 MMHG | DIASTOLIC BLOOD PRESSURE: 75 MMHG | RESPIRATION RATE: 18 BRPM | OXYGEN SATURATION: 100 % | HEART RATE: 81 BPM

## 2017-06-29 DIAGNOSIS — G43.009 NONINTRACTABLE MIGRAINE, UNSPECIFIED MIGRAINE TYPE: ICD-10-CM

## 2017-06-29 DIAGNOSIS — Z34.92 PREGNANT AND NOT YET DELIVERED, SECOND TRIMESTER: Primary | ICD-10-CM

## 2017-06-29 LAB
GLUCOSE BLD STRIP.AUTO-MCNC: 103 MG/DL (ref 65–100)
SERVICE CMNT-IMP: ABNORMAL

## 2017-06-29 PROCEDURE — 82962 GLUCOSE BLOOD TEST: CPT

## 2017-06-29 PROCEDURE — 99283 EMERGENCY DEPT VISIT LOW MDM: CPT

## 2017-06-29 RX ORDER — ACETAMINOPHEN 325 MG/1
650 TABLET ORAL
Qty: 20 TAB | Refills: 0 | Status: SHIPPED | OUTPATIENT
Start: 2017-06-29 | End: 2018-09-29

## 2017-06-30 NOTE — ED PROVIDER NOTES
HPI Comments: Blanche Castleman is a 31 y.o. Female (G=2, P=1, A=0) currently 14 weeks pregnant with PMHx of sarcoidosis who presents ambulatory to the ED c/o intermittent dizziness x 5 days. Pt complains of associated intermittent HA's that typically accompany her dizziness. She notes that she does not currently feel dizzy and is not currently experiencing a HA. Pt notes that she does not currently take any medication for her sarcoidosis but has previously been treated with Prednisone several months aog. She states that she plans to deliver her baby at [facility] and notes that she has not been told that her pregnancy is high risk. Pt denies any complications with the pregnancy. She notes that she has recently felt the baby move. Pt specifically denies nausea, vomiting, fever, chills, CP, or SOB. Social hx: - Tobacco use, - EtOH use, - Illicit drug use    PCP: None    There are no other complaints, changes or physical findings at this time. The history is provided by the patient. No  was used. Past Medical History:   Diagnosis Date    Sarcoidosis of lung ([facility] [address]     diagnosed 2005       History reviewed. No pertinent surgical history. History reviewed. No pertinent family history. Social History     Social History    Marital status: SINGLE     Spouse name: N/A    Number of children: N/A    Years of education: N/A     Occupational History    Not on file. Social History Main Topics    Smoking status: Never Smoker    Smokeless tobacco: Not on file    Alcohol use No    Drug use: No    Sexual activity: Yes     Partners: Male     Birth control/ protection: None     Other Topics Concern    Not on file     Social History Narrative         ALLERGIES: Review of patient's allergies indicates no known allergies. Review of Systems   Constitutional: Negative for chills and fever. HENT: Negative for congestion, rhinorrhea, sneezing and sore throat.     Eyes: Negative for redness and visual disturbance. Respiratory: Negative for shortness of breath. Cardiovascular: Negative for leg swelling. Gastrointestinal: Negative for abdominal pain, nausea and vomiting. Genitourinary: Negative for difficulty urinating and frequency. Musculoskeletal: Negative for back pain, myalgias and neck stiffness. Skin: Negative for rash. Neurological: Positive for dizziness and headaches. Negative for syncope and weakness. Hematological: Negative for adenopathy. Vitals:    06/29/17 2252   BP: 118/75   Pulse: 81   Resp: 18   Temp: 97.3 °F (36.3 °C)   SpO2: 100%            Physical Exam   Constitutional: She is oriented to person, place, and time. She appears well-developed and well-nourished. Moderately obese. HENT:   Head: Normocephalic and atraumatic. Mouth/Throat: Oropharynx is clear and moist.   Eyes: Conjunctivae and EOM are normal.   Neck: Normal range of motion and full passive range of motion without pain. Neck supple. Cardiovascular: Normal rate, regular rhythm, S1 normal, S2 normal, normal heart sounds, intact distal pulses and normal pulses. No murmur heard. Pulmonary/Chest: Effort normal. No respiratory distress. She has no wheezes. Diminished breath sounds BL. Abdominal: Soft. Normal appearance and bowel sounds are normal. There is no tenderness. There is no rebound. Abdomen consistent with pregnancy. Musculoskeletal: Normal range of motion. Neurological: She is alert and oriented to person, place, and time. She has normal strength. Skin: Skin is warm, dry and intact. No rash noted. Psychiatric: She has a normal mood and affect. Her speech is normal and behavior is normal. Judgment and thought content normal.   Nursing note and vitals reviewed.        MDM  Number of Diagnoses or Management Options  Nonintractable migraine, unspecified migraine type:   Pregnant and not yet delivered, second trimester:   Diagnosis management comments: DDx: tension HA, migraine HA, second trimester pregnancy       Amount and/or Complexity of Data Reviewed  Clinical lab tests: ordered and reviewed  Review and summarize past medical records: yes    Patient Progress  Patient progress: stable    ED Course       Procedures    Progress Note:  11:08 PM  Pt states that she is still asymptomatic. Written by Adriano Murphy, ED Scribe, as dictated by Paulina Schaeffer MD.    LABORATORY TESTS:  Recent Results (from the past 12 hour(s))   GLUCOSE, POC    Collection Time: 06/29/17 11:05 PM   Result Value Ref Range    Glucose (POC) 103 (H) 65 - 100 mg/dL    Performed by Swetha [place]        IMPRESSION:  1. Pregnant and not yet delivered, second trimester    2. Nonintractable migraine, unspecified migraine type        PLAN:  1. Current Discharge Medication List      START taking these medications    Details   acetaminophen (TYLENOL) 325 mg tablet Take 2 Tabs by mouth every four (4) hours as needed for Pain. Qty: 20 Tab, Refills: 0           2. Follow-up Information     Follow up With Details Comments Contact Info    None   None ([phone]) Patient stated that they have no PCP      [address] In 2 days follow up with your ob-gyn doctor [address]  [phone]        Return to ED if worse     DISCHARGE NOTE  11:18 PM  The patient has been re-evaluated and is ready for discharge. Reviewed available results with patient. Counseled pt on diagnosis and care plan. Pt has expressed understanding, and all questions have been answered. Pt agrees with plan and agrees to F/U as recommended, or return to the ED if their sxs worsen. Discharge instructions have been provided and explained to the pt, along with reasons to return to the ED. This note is prepared by Jeremiah Sheriff, acting as Scribe for Paulina Schaeffer MD.    Paulina Schaeffer MD: The scribe's documentation has been prepared under my direction and personally reviewed by me in its entirety.  I confirm that the note above accurately reflects all work, treatment, procedures, and medical decision making performed by me.

## 2017-06-30 NOTE — ED NOTES
Patient given copy of dc instructions and one script(s). Patient verbalized understanding of instructions and script (s). Patient given a current medication reconciliation form and verbalized understanding of their medications. Patient verbalized understanding of the importance of discussing medications with  his or her physician or clinic when they follow up. Patient alert and oriented and in no acute distress. Pt verbalizes pain scale of 0 out of 10. Patient discharged home ambulatory without assistance. Wheelchair declined.

## 2017-06-30 NOTE — DISCHARGE INSTRUCTIONS
Migraine Headache: Care Instructions  Your Care Instructions  Migraines are painful, throbbing headaches that often start on one side of the head. They may cause nausea and vomiting and make you sensitive to light, sound, or smell. Without treatment, migraines can last from 4 hours to a few days. Medicines can help prevent migraines or stop them after they have started. Your doctor can help you find which ones work best for you. Follow-up care is a key part of your treatment and safety. Be sure to make and go to all appointments, and call your doctor if you are having problems. It's also a good idea to know your test results and keep a list of the medicines you take. How can you care for yourself at home? · Do not drive if you have taken a prescription pain medicine. · Rest in a quiet, dark room until your headache is gone. Close your eyes, and try to relax or go to sleep. Don't watch TV or read. · Put a cold, moist cloth or cold pack on the painful area for 10 to 20 minutes at a time. Put a thin cloth between the cold pack and your skin. · Use a warm, moist towel or a heating pad set on low to relax tight shoulder and neck muscles. · Have someone gently massage your neck and shoulders. · Take your medicines exactly as prescribed. Call your doctor if you think you are having a problem with your medicine. You will get more details on the specific medicines your doctor prescribes. · Be careful not to take pain medicine more often than the instructions allow. You could get worse or more frequent headaches when the medicine wears off. To prevent migraines  · Keep a headache diary so you can figure out what triggers your headaches. Avoiding triggers may help you prevent headaches. Record when each headache began, how long it lasted, and what the pain was like.  (Was it throbbing, aching, stabbing, or dull?) Write down any other symptoms you had with the headache, such as nausea, flashing lights or dark spots, or sensitivity to bright light or loud noise. Note if the headache occurred near your period. List anything that might have triggered the headache. Triggers may include certain foods (chocolate, cheese, wine) or odors, smoke, bright light, stress, or lack of sleep. · If your doctor has prescribed medicine for your migraines, take it as directed. You may have medicine that you take only when you get a migraine and medicine that you take all the time to help prevent migraines. ¨ If your doctor has prescribed medicine for when you get a headache, take it at the first sign of a migraine, unless your doctor has given you other instructions. ¨ If your doctor has prescribed medicine to prevent migraines, take it exactly as prescribed. Call your doctor if you think you are having a problem with your medicine. · Find healthy ways to deal with stress. Migraines are most common during or right after stressful times. Take time to relax before and after you do something that has caused a migraine in the past.  · Try to keep your muscles relaxed by keeping good posture. Check your jaw, face, neck, and shoulder muscles for tension. Try to relax them. When you sit at a desk, change positions often. And make sure to stretch for 30 seconds each hour. · Get plenty of sleep and exercise. · Eat meals on a regular schedule. Avoid foods and drinks that often trigger migraines. These include chocolate, alcohol (especially red wine and port), aspartame, monosodium glutamate (MSG), and some additives found in foods (such as hot dogs, mcneal, cold cuts, aged cheeses, and pickled foods). · Limit caffeine. Don't drink too much coffee, tea, or soda. But don't quit caffeine suddenly. That can also give you migraines. · Do not smoke or allow others to smoke around you. If you need help quitting, talk to your doctor about stop-smoking programs and medicines. These can increase your chances of quitting for good.   · If you are taking birth control pills or hormone therapy, talk to your doctor about whether they are triggering your migraines. When should you call for help? Call 911 anytime you think you may need emergency care. For example, call if:  · You have signs of a stroke. These may include:  ¨ Sudden numbness, paralysis, or weakness in your face, arm, or leg, especially on only one side of your body. ¨ Sudden vision changes. ¨ Sudden trouble speaking. ¨ Sudden confusion or trouble understanding simple statements. ¨ Sudden problems with walking or balance. ¨ A sudden, severe headache that is different from past headaches. Call your doctor now or seek immediate medical care if:  · You have new or worse nausea and vomiting. · You have a new or higher fever. · Your headache gets much worse. Watch closely for changes in your health, and be sure to contact your doctor if:  · You are not getting better after 2 days (48 hours). Where can you learn more? Go to http://brown-katie.info/. Enter Z600 in the search box to learn more about \"Migraine Headache: Care Instructions. \"  Current as of: October 14, 2016  Content Version: 11.3  © 8643-4959 [employer]. Care instructions adapted under license by [email or web address] (which disclaims liability or warranty for this information). If you have questions about a medical condition or this instruction, always ask your healthcare professional. [address] any warranty or liability for your use of this information. Migraine Headache: Care Instructions  Your Care Instructions  Migraines are painful, throbbing headaches that often start on one side of the head. They may cause nausea and vomiting and make you sensitive to light, sound, or smell. Without treatment, migraines can last from 4 hours to a few days. Medicines can help prevent migraines or stop them after they have started.  Your doctor can help you find which ones work best for you. Follow-up care is a key part of your treatment and safety. Be sure to make and go to all appointments, and call your doctor if you are having problems. It's also a good idea to know your test results and keep a list of the medicines you take. How can you care for yourself at home? · Do not drive if you have taken a prescription pain medicine. · Rest in a quiet, dark room until your headache is gone. Close your eyes, and try to relax or go to sleep. Don't watch TV or read. · Put a cold, moist cloth or cold pack on the painful area for 10 to 20 minutes at a time. Put a thin cloth between the cold pack and your skin. · Use a warm, moist towel or a heating pad set on low to relax tight shoulder and neck muscles. · Have someone gently massage your neck and shoulders. · Take your medicines exactly as prescribed. Call your doctor if you think you are having a problem with your medicine. You will get more details on the specific medicines your doctor prescribes. · Be careful not to take pain medicine more often than the instructions allow. You could get worse or more frequent headaches when the medicine wears off. To prevent migraines  · Keep a headache diary so you can figure out what triggers your headaches. Avoiding triggers may help you prevent headaches. Record when each headache began, how long it lasted, and what the pain was like. (Was it throbbing, aching, stabbing, or dull?) Write down any other symptoms you had with the headache, such as nausea, flashing lights or dark spots, or sensitivity to bright light or loud noise. Note if the headache occurred near your period. List anything that might have triggered the headache. Triggers may include certain foods (chocolate, cheese, wine) or odors, smoke, bright light, stress, or lack of sleep. · If your doctor has prescribed medicine for your migraines, take it as directed.  You may have medicine that you take only when you get a migraine and medicine that you take all the time to help prevent migraines. ¨ If your doctor has prescribed medicine for when you get a headache, take it at the first sign of a migraine, unless your doctor has given you other instructions. ¨ If your doctor has prescribed medicine to prevent migraines, take it exactly as prescribed. Call your doctor if you think you are having a problem with your medicine. · Find healthy ways to deal with stress. Migraines are most common during or right after stressful times. Take time to relax before and after you do something that has caused a migraine in the past.  · Try to keep your muscles relaxed by keeping good posture. Check your jaw, face, neck, and shoulder muscles for tension. Try to relax them. When you sit at a desk, change positions often. And make sure to stretch for 30 seconds each hour. · Get plenty of sleep and exercise. · Eat meals on a regular schedule. Avoid foods and drinks that often trigger migraines. These include chocolate, alcohol (especially red wine and port), aspartame, monosodium glutamate (MSG), and some additives found in foods (such as hot dogs, mcneal, cold cuts, aged cheeses, and pickled foods). · Limit caffeine. Don't drink too much coffee, tea, or soda. But don't quit caffeine suddenly. That can also give you migraines. · Do not smoke or allow others to smoke around you. If you need help quitting, talk to your doctor about stop-smoking programs and medicines. These can increase your chances of quitting for good. · If you are taking birth control pills or hormone therapy, talk to your doctor about whether they are triggering your migraines. When should you call for help? Call 911 anytime you think you may need emergency care. For example, call if:  · You have signs of a stroke. These may include:  ¨ Sudden numbness, paralysis, or weakness in your face, arm, or leg, especially on only one side of your body. ¨ Sudden vision changes.   ¨ Sudden trouble speaking. ¨ Sudden confusion or trouble understanding simple statements. ¨ Sudden problems with walking or balance. ¨ A sudden, severe headache that is different from past headaches. Call your doctor now or seek immediate medical care if:  · You have new or worse nausea and vomiting. · You have a new or higher fever. · Your headache gets much worse. Watch closely for changes in your health, and be sure to contact your doctor if:  · You are not getting better after 2 days (48 hours). Where can you learn more? Go to http://rbown-katie.info/. Enter Q199 in the search box to learn more about \"Migraine Headache: Care Instructions. \"  Current as of: October 14, 2016  Content Version: 11.3  © 6196-0535 [employer]. Care instructions adapted under license by [employer] (which disclaims liability or warranty for this information). If you have questions about a medical condition or this instruction, always ask your healthcare professional. [place] [address] any warranty or liability for your use of this information. Learning About Pregnancy  Your Care Instructions  Your health in the early weeks of your pregnancy is particularly important for your babys health. Take good care of yourself. Anything you do that harms your body can also harm your baby. Make sure to go to all of your doctor appointments. Regular checkups will help keep you and your baby healthy. Follow-up care is a key part of your treatment and safety. Be sure to make and go to all appointments, and call your doctor if you are having problems. Its also a good idea to know your test results and keep a list of the medicines you take. How can you care for yourself at home? Diet  · Eat a balanced diet. Make sure your diet includes plenty of beans, peas, and leafy green vegetables. · Do not skip meals or go for many hours without eating.  If you are nauseated, try to eat a small, healthy snack every 2 to 3 hours. · Do not eat fish that has a high level of mercury, such as shark, swordfish, or mackerel. Do not eat more than one can of tuna each week. · Drink plenty of fluids, enough so that your urine is light yellow or clear like water. If you have kidney, heart, or liver disease and have to limit fluids, talk with your doctor before you increase the amount of fluids you drink. · Cut down on caffeine, such as coffee, tea, and cola. · Do not drink alcohol, such as beer, wine, or hard liquor. · Take a multivitamin that contains at least 400 micrograms (mcg) of folic acid to help prevent birth defects. Fortified cereal and whole wheat bread are good additional sources of folic acid. · Increase the calcium in your diet. Try to drink a quart of skim milk each day. You may also take calcium supplements and choose foods such as cheese and yogurt. Lifestyle  · Make sure you go to your follow-up appointments. · Get plenty of rest. You may be unusually tired while you are pregnant. · Get at least 30 minutes of exercise on most days of the week. Walking is a good choice. If you have not exercised in the past, start out slowly. Take several short walks each day. · Do not smoke. If you need help quitting, talk to your doctor about stop-smoking programs. These can increase your chances of quitting for good. · Do not touch cat feces or litter boxes. Also, wash your hands after you handle raw meat, and fully cook all meat before you eat it. Wear gloves when you work in the yard or garden, and wash your hands well when you are done. Cat feces, raw or undercooked meat, and contaminated dirt can cause an infection that may harm your baby or lead to a miscarriage. · Do not use saunas or hot tubs. Raising your body temperature may harm your baby. · Avoid chemical fumes, paint fumes, or poisons. · Do not use illegal drugs or alcohol. Medicines  · Review all of your medicines with your doctor.  Some of your routine medicines may need to be changed to protect your baby. · Use acetaminophen (Tylenol) to relieve minor problems, such as a mild headache or backache or a mild fever with cold symptoms. Do not use nonsteroidal anti-inflammatory drugs (NSAIDs), such as ibuprofen (Advil, Motrin) or naproxen (Aleve), unless your doctor says it is okay. · Do not take two or more pain medicines at the same time unless the doctor told you to. Many pain medicines have acetaminophen, which is Tylenol. Too much acetaminophen (Tylenol) can be harmful. · Take your medicines exactly as prescribed. Call your doctor if you think you are having a problem with your medicine. To manage morning sickness  · If you feel sick when you first wake up, try eating a small snack (such as crackers) before you get out of bed. Allow some time to digest the snack, and then get out of bed slowly. · Do not skip meals or go for long periods without eating. An empty stomach can make nausea worse. · Eat small, frequent meals instead of three large meals each day. · Drink plenty of fluids. Sports drinks, such as Gatorade or Powerade, are good choices. · Eat foods that are high in protein but low in fat. · If you are taking iron supplements, ask your doctor if they are necessary. Iron can make nausea worse. · Avoid any smells, such as coffee, that make you feel sick. · Get lots of rest. Morning sickness may be worse when you are tired. Where can you learn more? Go to http://brown-katie.info/. Enter Y870 in the search box to learn more about \"Learning About Pregnancy. \"  Current as of: March 16, 2017  Content Version: 11.3  © 9044-9639 [employer]. Care instructions adapted under license by [employer] (which disclaims liability or warranty for this information).  If you have questions about a medical condition or this instruction, always ask your healthcare professional. Alma Delia Duenas disclaims any warranty or liability for your use of this information. Learning About Pregnancy  Your Care Instructions  Your health in the early weeks of your pregnancy is particularly important for your babys health. Take good care of yourself. Anything you do that harms your body can also harm your baby. Make sure to go to all of your doctor appointments. Regular checkups will help keep you and your baby healthy. Follow-up care is a key part of your treatment and safety. Be sure to make and go to all appointments, and call your doctor if you are having problems. Its also a good idea to know your test results and keep a list of the medicines you take. How can you care for yourself at home? Diet  · Eat a balanced diet. Make sure your diet includes plenty of beans, peas, and leafy green vegetables. · Do not skip meals or go for many hours without eating. If you are nauseated, try to eat a small, healthy snack every 2 to 3 hours. · Do not eat fish that has a high level of mercury, such as shark, swordfish, or mackerel. Do not eat more than one can of tuna each week. · Drink plenty of fluids, enough so that your urine is light yellow or clear like water. If you have kidney, heart, or liver disease and have to limit fluids, talk with your doctor before you increase the amount of fluids you drink. · Cut down on caffeine, such as coffee, tea, and cola. · Do not drink alcohol, such as beer, wine, or hard liquor. · Take a multivitamin that contains at least 400 micrograms (mcg) of folic acid to help prevent birth defects. Fortified cereal and whole wheat bread are good additional sources of folic acid. · Increase the calcium in your diet. Try to drink a quart of skim milk each day. You may also take calcium supplements and choose foods such as cheese and yogurt. Lifestyle  · Make sure you go to your follow-up appointments. · Get plenty of rest. You may be unusually tired while you are pregnant.   · Get at least 30 minutes of exercise on most days of the week. Walking is a good choice. If you have not exercised in the past, start out slowly. Take several short walks each day. · Do not smoke. If you need help quitting, talk to your doctor about stop-smoking programs. These can increase your chances of quitting for good. · Do not touch cat feces or litter boxes. Also, wash your hands after you handle raw meat, and fully cook all meat before you eat it. Wear gloves when you work in the yard or garden, and wash your hands well when you are done. Cat feces, raw or undercooked meat, and contaminated dirt can cause an infection that may harm your baby or lead to a miscarriage. · Do not use saunas or hot tubs. Raising your body temperature may harm your baby. · Avoid chemical fumes, paint fumes, or poisons. · Do not use illegal drugs or alcohol. Medicines  · Review all of your medicines with your doctor. Some of your routine medicines may need to be changed to protect your baby. · Use acetaminophen (Tylenol) to relieve minor problems, such as a mild headache or backache or a mild fever with cold symptoms. Do not use nonsteroidal anti-inflammatory drugs (NSAIDs), such as ibuprofen (Advil, Motrin) or naproxen (Aleve), unless your doctor says it is okay. · Do not take two or more pain medicines at the same time unless the doctor told you to. Many pain medicines have acetaminophen, which is Tylenol. Too much acetaminophen (Tylenol) can be harmful. · Take your medicines exactly as prescribed. Call your doctor if you think you are having a problem with your medicine. To manage morning sickness  · If you feel sick when you first wake up, try eating a small snack (such as crackers) before you get out of bed. Allow some time to digest the snack, and then get out of bed slowly. · Do not skip meals or go for long periods without eating. An empty stomach can make nausea worse.   · Eat small, frequent meals instead of three large meals each day. · Drink plenty of fluids. Sports drinks, such as Gatorade or Powerade, are good choices. · Eat foods that are high in protein but low in fat. · If you are taking iron supplements, ask your doctor if they are necessary. Iron can make nausea worse. · Avoid any smells, such as coffee, that make you feel sick. · Get lots of rest. Morning sickness may be worse when you are tired. Where can you learn more? Go to http://brown-katie.info/. Enter E828 in the search box to learn more about \"Learning About Pregnancy. \"  Current as of: March 16, 2017  Content Version: 11.3  © 7366-6038 [employer]. Care instructions adapted under license by [employer] (which disclaims liability or warranty for this information). If you have questions about a medical condition or this instruction, always ask your healthcare professional. [address] any warranty or liability for your use of this information.

## 2017-08-11 ENCOUNTER — HOSPITAL ENCOUNTER (EMERGENCY)
Age: 27
Discharge: HOME | End: 2017-08-11
Attending: EMERGENCY MEDICINE
Payer: COMMERCIAL

## 2017-08-11 ENCOUNTER — APPOINTMENT (OUTPATIENT)
Dept: GENERAL RADIOLOGY | Age: 27
End: 2017-08-11
Attending: PHYSICIAN ASSISTANT
Payer: COMMERCIAL

## 2017-08-11 VITALS
BODY MASS INDEX: 32.49 KG/M2 | HEART RATE: 92 BPM | TEMPERATURE: 98.3 F | OXYGEN SATURATION: 97 % | SYSTOLIC BLOOD PRESSURE: 123 MMHG | DIASTOLIC BLOOD PRESSURE: 63 MMHG | HEIGHT: 65 IN | RESPIRATION RATE: 16 BRPM | WEIGHT: 195 LBS

## 2017-08-11 DIAGNOSIS — R06.02 SOB (SHORTNESS OF BREATH): ICD-10-CM

## 2017-08-11 DIAGNOSIS — Z86.2 H/O SARCOIDOSIS: ICD-10-CM

## 2017-08-11 DIAGNOSIS — J20.9 ACUTE BRONCHITIS, UNSPECIFIED ORGANISM: Primary | ICD-10-CM

## 2017-08-11 PROCEDURE — 71020 XR CHEST PA LAT: CPT

## 2017-08-11 PROCEDURE — 99283 EMERGENCY DEPT VISIT LOW MDM: CPT

## 2017-08-11 NOTE — ED PROVIDER NOTES
Patient is a 28 y.o. female presenting with cough. The history is provided by the patient. Cough   This is a recurrent problem. Episode onset: 2 wks. The problem occurs constantly. The problem has not changed since onset. The cough is non-productive. There has been no fever. Associated symptoms include shortness of breath. Pertinent negatives include no rhinorrhea, no sore throat, no myalgias, no wheezing, no nausea, no vomiting and no confusion. Associated symptoms comments: Pt denies any pain. Pt states she thinks she is working too much and is thinking about quitting. She has tried steroids (pt states she just finished a week long dose of prednisone today) for the symptoms. The treatment provided no relief. Risk factors: Pt is 5mo pregnant. She is not a smoker. Her past medical history does not include bronchitis, pneumonia, asthma or heart failure. Past medical history comments: PMH: sarcoidosis. 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 No family history on file. Social History     Social History    Marital status: SINGLE     Spouse name: N/A    Number of children: N/A    Years of education: N/A     Occupational History    Not on file. Social History Main Topics    Smoking status: Never Smoker    Smokeless tobacco: Never Used    Alcohol use No    Drug use: No    Sexual activity: Yes     Partners: Male     Birth control/ protection: None     Other Topics Concern    Not on file     Social History Narrative         ALLERGIES: Review of patient's allergies indicates no known allergies. Review of Systems   Constitutional: Negative for fever. HENT: Negative for rhinorrhea and sore throat. Respiratory: Positive for cough and shortness of breath. Negative for wheezing and stridor. Gastrointestinal: Negative for nausea and vomiting. Musculoskeletal: Negative for myalgias.    Neurological: Negative for speech difficulty. Psychiatric/Behavioral: Negative for confusion. All other systems reviewed and are negative. Vitals:    08/11/17 1656   BP: 123/63   Pulse: 92   Resp: 16   Temp: 98.3 °F (36.8 °C)   SpO2: 97%   Weight: 88.5 kg (195 lb)   Height: 5' 5\" (1.651 m)            Physical Exam   Constitutional: She is oriented to person, place, and time. She appears well-developed and well-nourished. No distress. HENT:   Head: Normocephalic and atraumatic. Right Ear: Tympanic membrane normal.   Left Ear: Tympanic membrane normal.   Mouth/Throat: Oropharynx is clear and moist. No oropharyngeal exudate. Eyes: Conjunctivae are normal.   Cardiovascular: Normal rate, regular rhythm and normal heart sounds. Pulmonary/Chest: Effort normal and breath sounds normal. No respiratory distress. She has no wheezes. She has no rales. Abdominal: She exhibits distension (gravid abdomen). Musculoskeletal: Normal range of motion. Neurological: She is alert and oriented to person, place, and time. Skin: Skin is warm and dry. Psychiatric: She has a normal mood and affect. Her behavior is normal. Judgment and thought content normal.   Nursing note and vitals reviewed. MDM  Number of Diagnoses or Management Options  Diagnosis management comments: DDX: PNA, sarcoidosis, bronchitis    Progress note:  Discussed with pt risks of radiation for CXR but that she would be shielded so risks are low.   Pt in agreement with CXR       Amount and/or Complexity of Data Reviewed  Tests in the radiology section of CPT®: ordered and reviewed  Discuss the patient with other providers: yes (Discussed with attending, Dr Heide Ferrer, advises on CXR)      ED Course       Procedures

## 2017-08-11 NOTE — ED NOTES
----- Message from Chuck Delcid MD sent at 3/20/2017  3:06 PM CDT -----  Have home health obtain Hemoccult of stool ×3 days.  ----- Message -----     From: Yung Norman RN     Sent: 3/20/2017   1:36 PM       To: Chuck Delcid MD    Home Health called and states patient reported some black stool. She isn't symptomatic and not having any bright red bleeding. She had Protime/INR 03/17/17 and was 2.7. TP    I spoke to Patience at [facility] with order for hemocult stool x 3 days. TP   Emergency Department Nursing Plan of Care       The Nursing Plan of Care is developed from the Nursing assessment and Emergency Department Attending provider initial evaluation. The plan of care may be reviewed in the ED Provider note.     The Plan of Care was developed with the following considerations:   Patient / Family readiness to learn indicated by:verbalized understanding  Persons(s) to be included in education: patient  Barriers to Learning/Limitations:No    [address], RN    8/11/2017   5:06 PM

## 2017-08-11 NOTE — DISCHARGE INSTRUCTIONS
Bronchitis: Care Instructions  Your Care Instructions    Bronchitis is inflammation of the bronchial tubes, which carry air to the lungs. The tubes swell and produce mucus, or phlegm. The mucus and inflamed bronchial tubes make you cough. You may have trouble breathing. Most cases of bronchitis are caused by viruses like those that cause colds. Antibiotics usually do not help and they may be harmful. Bronchitis usually develops rapidly and lasts about 2 to 3 weeks in otherwise healthy people. Follow-up care is a key part of your treatment and safety. Be sure to make and go to all appointments, and call your doctor if you are having problems. It's also a good idea to know your test results and keep a list of the medicines you take. How can you care for yourself at home? · Take all medicines exactly as prescribed. Call your doctor if you think you are having a problem with your medicine. · Get some extra rest.  · Take an over-the-counter pain medicine, such as acetaminophen (Tylenol), ibuprofen (Advil, Motrin), or naproxen (Aleve) to reduce fever and relieve body aches. Read and follow all instructions on the label. · Do not take two or more pain medicines at the same time unless the doctor told you to. Many pain medicines have acetaminophen, which is Tylenol. Too much acetaminophen (Tylenol) can be harmful. · Take an over-the-counter cough medicine that contains dextromethorphan to help quiet a dry, hacking cough so that you can sleep. Avoid cough medicines that have more than one active ingredient. Read and follow all instructions on the label. · Breathe moist air from a humidifier, hot shower, or sink filled with hot water. The heat and moisture will thin mucus so you can cough it out. · Do not smoke. Smoking can make bronchitis worse. If you need help quitting, talk to your doctor about stop-smoking programs and medicines. These can increase your chances of quitting for good.   When should you call for help? Call 911 anytime you think you may need emergency care. For example, call if:  · You have severe trouble breathing. Call your doctor now or seek immediate medical care if:  · You have new or worse trouble breathing. · You cough up dark brown or bloody mucus (sputum). · You have a new or higher fever. · You have a new rash. Watch closely for changes in your health, and be sure to contact your doctor if:  · You cough more deeply or more often, especially if you notice more mucus or a change in the color of your mucus. · You are not getting better as expected. Where can you learn more? Go to http://brown-katie.info/. Enter H333 in the search box to learn more about \"Bronchitis: Care Instructions. \"  Current as of: March 25, 2017  Content Version: 11.3  © 5561-0449 [employer]. Care instructions adapted under license by [employer] (which disclaims liability or warranty for this information). If you have questions about a medical condition or this instruction, always ask your healthcare professional. [place] [address] any warranty or liability for your use of this information. Shortness of Breath: Care Instructions  Your Care Instructions  Shortness of breath has many causes. Sometimes conditions such as anxiety can lead to shortness of breath. Some people get mild shortness of breath when they exercise. Trouble breathing also can be a symptom of a serious problem, such as asthma, lung disease, emphysema, heart problems, and pneumonia. If your shortness of breath continues, you may need tests and treatment. Watch for any changes in your breathing and other symptoms. Follow-up care is a key part of your treatment and safety. Be sure to make and go to all appointments, and call your doctor if you are having problems. Its also a good idea to know your test results and keep a list of the medicines you take.   How can you care for yourself at home? · Do not smoke or allow others to smoke around you. If you need help quitting, talk to your doctor about stop-smoking programs and medicines. These can increase your chances of quitting for good. · Get plenty of rest and sleep. · Take your medicines exactly as prescribed. Call your doctor if you think you are having a problem with your medicine. · Find healthy ways to deal with stress. ¨ Exercise daily. ¨ Get plenty of sleep. ¨ Eat regularly and well. When should you call for help? Call 911 anytime you think you may need emergency care. For example, call if:  · You have severe shortness of breath. · You have symptoms of a heart attack. These may include:  ¨ Chest pain or pressure, or a strange feeling in the chest.  ¨ Sweating. ¨ Shortness of breath. ¨ Nausea or vomiting. ¨ Pain, pressure, or a strange feeling in the back, neck, jaw, or upper belly or in one or both shoulders or arms. ¨ Lightheadedness or sudden weakness. ¨ A fast or irregular heartbeat. After you call 911, the  may tell you to chew 1 adult-strength or 2 to 4 low-dose aspirin. Wait for an ambulance. Do not try to drive yourself. Call your doctor now or seek immediate medical care if:  · Your shortness of breath gets worse or you start to wheeze. Wheezing is a high-pitched sound when you breathe. · You wake up at night out of breath or have to prop your head up on several pillows to breathe. · You are short of breath after only light activity or while at rest.  Watch closely for changes in your health, and be sure to contact your doctor if:  · You do not get better over the next 1 to 2 days. Where can you learn more? Go to http://brown-katie.info/. Enter S780 in the search box to learn more about \"Shortness of Breath: Care Instructions. \"  Current as of: March 25, 2017  Content Version: 11.3  © 1646-8104 [employer].  Care instructions adapted under license by [employer] (which disclaims liability or warranty for this information). If you have questions about a medical condition or this instruction, always ask your healthcare professional. [address] any warranty or liability for your use of this information.

## 2017-08-11 NOTE — LETTER
[facility] EMERGENCY DEPT 
[address] [place] [address] [address] [id number] [phone] Work/School Note Date: 8/11/2017 To Whom It May concern: 
 
Kerry Andrews was seen and treated today in the emergency room by the following provider(s): 
Attending Provider: Maxi Ramires MD 
Physician Assistant: Erika Stevens PA-C. Kerry Andrews may return to work on 8/14/17. Sincerely, Erika Stevens PA-C

## 2017-09-26 ENCOUNTER — HOSPITAL ENCOUNTER (EMERGENCY)
Age: 27
Discharge: HOME | End: 2017-09-26
Attending: INTERNAL MEDICINE
Payer: COMMERCIAL

## 2017-09-26 VITALS
HEART RATE: 98 BPM | OXYGEN SATURATION: 96 % | DIASTOLIC BLOOD PRESSURE: 73 MMHG | RESPIRATION RATE: 18 BRPM | TEMPERATURE: 98.8 F | BODY MASS INDEX: 33.82 KG/M2 | WEIGHT: 203 LBS | HEIGHT: 65 IN | SYSTOLIC BLOOD PRESSURE: 127 MMHG

## 2017-09-26 DIAGNOSIS — K08.89 DENTALGIA: Primary | ICD-10-CM

## 2017-09-26 DIAGNOSIS — K09.9 ORAL SOFT TISSUE CYST: ICD-10-CM

## 2017-09-26 PROCEDURE — 99282 EMERGENCY DEPT VISIT SF MDM: CPT

## 2017-09-26 RX ORDER — PENICILLIN V POTASSIUM 500 MG/1
500 TABLET, FILM COATED ORAL 4 TIMES DAILY
Qty: 28 TAB | Refills: 0 | Status: SHIPPED | OUTPATIENT
Start: 2017-09-26 | End: 2017-10-03

## 2017-09-26 RX ORDER — ACETAMINOPHEN 325 MG/1
650 TABLET ORAL
Qty: 20 TAB | Refills: 0 | Status: SHIPPED | OUTPATIENT
Start: 2017-09-26 | End: 2018-09-29

## 2017-09-26 NOTE — ED NOTES
Pt arrived to the ED with c/o mouth/dental pain r/t nodule approximately 1.5 cm in diameter on the roof of her mouth proximal to a previously extracted tooth. Pt denies n/v/d, fever, headache. ..  Emergency Department Nursing Plan of Care       The Nursing Plan of Care is developed from the Nursing assessment and Emergency Department Attending provider initial evaluation. The plan of care may be reviewed in the ED Provider note.     The Plan of Care was developed with the following considerations:   Patient / Family readiness to learn indicated by:verbalized understanding  Persons(s) to be included in education: patient  Barriers to Learning/Limitations:No    Signed     Olivia Favre    9/26/2017   4:50 PM

## 2017-09-26 NOTE — ED PROVIDER NOTES
Patient is a 28 y.o. female presenting with dental problem. The history is provided by the patient. Dental Pain    This is a new (Pt ednorses Lt upper dental pain and swelling x 1 week. No fever, chills, n/v, drainage. Pt endorses she is 27 wks pregnant. No abd pain, vaginal bleeding, rush of fluids.) problem. The current episode started more than 2 days ago. The problem occurs constantly. The problem has been gradually worsening. The pain is located in the left upper mouth. The quality of the pain is intermittent. The pain is at a severity of 5/10. The pain is mild. Associated symptoms include swelling. There was no vomiting, no nausea, no fever, no chest pain, no shortness of breath, no headaches, no gum redness and no drainage. She has tried nothing for the symptoms. The patient has no cardiac history. 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 No family history on file. Social History     Social History    Marital status: SINGLE     Spouse name: N/A    Number of children: N/A    Years of education: N/A     Occupational History    Not on file. Social History Main Topics    Smoking status: Never Smoker    Smokeless tobacco: Never Used    Alcohol use No    Drug use: No    Sexual activity: Yes     Partners: Male     Birth control/ protection: None     Other Topics Concern    Not on file     Social History Narrative         ALLERGIES: Review of patient's allergies indicates no known allergies. Review of Systems   Constitutional: Negative. Negative for activity change, chills, fatigue and fever. HENT: Positive for dental problem. Negative for congestion, drooling, ear discharge, ear pain, facial swelling, hearing loss, mouth sores, nosebleeds, postnasal drip, rhinorrhea, sinus pressure, sore throat and trouble swallowing. Eyes: Negative. Negative for pain and discharge. Respiratory: Negative. Negative for cough, chest tightness and shortness of breath. Cardiovascular: Negative. Negative for chest pain. Gastrointestinal: Negative for constipation, diarrhea, nausea and vomiting. Genitourinary: Negative. Musculoskeletal: Negative. Negative for joint swelling. Skin: Negative. Negative for rash. Neurological: Negative. Negative for dizziness, speech difficulty, light-headedness and headaches. Psychiatric/Behavioral: Negative. Vitals:    17 1615   BP: 127/73   Pulse: 100   Resp: 18   Temp: 98.8 °F (37.1 °C)   SpO2: 96%   Weight: 92.1 kg (203 lb)   Height: 5' 5\" (1.651 m)            Physical Exam   Constitutional: She is oriented to person, place, and time. She appears well-developed and well-nourished. No distress. HENT:   Head: Normocephalic and atraumatic. Right Ear: Hearing, tympanic membrane, external ear and ear canal normal.   Left Ear: Hearing, tympanic membrane, external ear and ear canal normal.   Nose: Nose normal. No sinus tenderness. Right sinus exhibits no maxillary sinus tenderness and no frontal sinus tenderness. Left sinus exhibits no maxillary sinus tenderness and no frontal sinus tenderness. Mouth/Throat: Oropharynx is clear and moist and mucous membranes are normal. She does not have dentures. Oral lesions present. No trismus in the jaw. Abnormal dentition. Dental caries present. No dental abscesses or uvula swelling. No oropharyngeal exudate, posterior oropharyngeal edema, posterior oropharyngeal erythema or tonsillar abscesses. 1.5 cm dm nodule to Lt upper hard palate. No TTP, erythema, drainage, pustule, streaking, fluctuance, induration. Eyes: Conjunctivae and EOM are normal. Pupils are equal, round, and reactive to light. Neck: Normal range of motion. Neck supple. Cardiovascular: Normal rate, regular rhythm and normal heart sounds. Pulmonary/Chest: Effort normal and breath sounds normal.   Abdominal:   .    Neurological: She is alert and oriented to person, place, and time. Skin: Skin is warm and dry. She is not diaphoretic. Psychiatric: She has a normal mood and affect. Her behavior is normal. Judgment and thought content normal.   Nursing note and vitals reviewed. MDM  Number of Diagnoses or Management Options  Dentalgia:   Oral soft tissue cyst:   Diagnosis management comments: DDx: cyst, abscess, dentalgia, dental caries, gingivitis    LABORATORY TESTS:  No results found for this or any previous visit (from the past 12 hour(s)). IMAGING RESULTS:  No orders to display    MEDICATIONS GIVEN:  Medications - No data to display    IMPRESSION:  Bee Shi  (primary encounter diagnosis)  Oral soft tissue cyst    PLAN:  1. Current Discharge Medication List    START taking these medications    penicillin v potassium (VEETID) 500 mg tablet  Take 1 Tab by mouth four (4) times daily for 7 days. Qty: 28 Tab Refills: 0    !! acetaminophen (TYLENOL) 325 mg tablet  Take 2 Tabs by mouth every four (4) hours as needed for Pain. Qty: 20 Tab Refills: 0    !! - Potential duplicate medications found. Please discuss with provider. CONTINUE these medications which have NOT CHANGED    albuterol (PROVENTIL HFA, VENTOLIN HFA, PROAIR HFA) 90 mcg/actuation inhaler   Take 2 Puffs by inhalation every four (4) hours as needed for Wheezing. Qty: 1 Inhaler Refills: 1    !! acetaminophen (TYLENOL) 325 mg tablet  Take 2 Tabs by mouth every four (4) hours as needed for Pain. Qty: 20 Tab Refills: 0    azaTHIOprine (IMURAN) 50 mg tablet  Take 50 mg by mouth daily. ondansetron hcl (ZOFRAN, AS HYDROCHLORIDE,) 4 mg tablet  Take 1 Tab by mouth every eight (8) hours as needed for Nausea. Qty: 15 Tab Refills: 0    raNITIdine (ZANTAC) 75 mg tablet  Take 1 Tab by mouth two (2) times a day. Qty: 14 Tab Refills: 0    !! - Potential duplicate medications found. Please discuss with provider.         2. Follow-up Information     Follow up With Details Comments [facility] [place] 2[place]0 [facility] Schedule an appointment as soon   as possible for a visit in 3 days As needed, If symptoms worsen [address] [id number] [phone]      Return to ED if worse               Patient Progress  Patient progress: stable    ED Course       Procedures    4:37 PM  I have discussed with patient their diagnosis, treatment, and follow up plan. The patient agrees to follow up as outlined in discharge paperwork and also to return to the ED with any worsening.  Magnus Rush PA-C

## 2017-09-26 NOTE — ED NOTES
....Discharge summary and discharge medications reviewed with patient and appropriate educational materials and side effects teaching were provided. patient  Given 0 paper prescriptions and 2 electronic prescriptions sent to pt's listed pharmacy. Patient verbalized understanding of the importance of discussing medications with his or her physician or clinic they will be following up with. No si/s of acute distress prior to discharge. Patient offered wheelchair from treatment area to hospital entrance, patient declined wheelchair.

## 2017-09-26 NOTE — DISCHARGE INSTRUCTIONS
Emergency [address] by JULIAN [facility]  [address], [address]  Open M, W, F: 8AM - 5PM and T, Th: 8AM-6PM  Phone: [phone], press 4  $70 for Emergency Care  $60 for first routine care, then pay by sliding scale based upon income. [facility]  [address]  Phone: [phone]    [address] Dentistry  [address]  Phone: [phone]  Fee: $75 Routine Extraction, $125 Complex Extraction  Open Monday - Friday 8AM - 5:00PM    [facility]  [address] [facility], [address]  Open Monday - Friday 8AM - 4:30 PM  Phone: [phone]  [facility] [address] [place] [address] Dentistry, [address] [place] [address], 1st Floor  First Come First Service starting at 8:30 AM M-F  Phone: [phone], press 2  Fee: $150 per tooth (x-ray & extractions only)  Pediatrics Phone[de-identified] [phone], 8-5 M-F    [address], [address] [address], 2nd Floor, [address] [place] [address] starting at 8:30 AM - 3 PM [address] [place] [address], [address] [place] [address]  Phone: [phone] or [phone]  Emergency Hours: 9:30AM - 11AM (extractions)  Simple tooth extraction $ per tooth. #75 for x-ray    [facility] Residents only, over the age of 25  Phone: [phone]. Leave message saying you need an appointment to register. Hours: Tuesday Evenings       Periodontal Conditions: Care Instructions  Your Care Instructions    Periodontal conditions affect the gums, bone, and tissue that surround and support the teeth. The most common problems are caused by plaque. Plaque is a thin film of bacteria that sticks to teeth above and below the gum line. It can build up and harden into tartar.  The bacteria in plaque and tartar can cause gum disease. Gingivitis is a disease that affects the gums (gingiva). The gums are the soft tissue that surrounds the teeth. Gingivitis causes red, swollen, tender gums that bleed easily when brushed, persistent bad breath, and sensitive teeth. Because it is not painful, many people do not get treatment when they should. Gingivitis can be reversed with good dental care. Periodontitis is a more advanced disease that affects more than the gums. The gums pull away from the teeth. This leaves deep pockets where bacteria can grow. The disease can damage the bones that support the teeth. The teeth may get loose and fall out. A periodontal condition should be treated as soon as it is found. Finding gum problems early, treating them right away, and having regular checkups bring the best results. You can treat mild periodontal conditions by brushing and flossing your teeth every day. Your dentist may prescribe a mouthwash to kill the bacteria that can damage teeth and gums. Your dentist may have you take antibiotics to treat infection from moderate periodontal disease. If your gums have pulled away from your teeth, you may need cleaning between the teeth and gums right down to the teeth roots. This is called root planing and scaling. If you have severe periodontal disease, you may need surgery to remove diseased gum tissue or repair bone damage. Follow-up care is a key part of your treatment and safety. Be sure to make and go to all appointments, and call your dentist if you are having problems. It's also a good idea to know your test results and keep a list of the medicines you take. How can you care for yourself at home? · If your dentist prescribed antibiotics, take them as directed. Do not stop taking them just because you feel better. You need to take the full course of antibiotics. · Brush your teeth twice a day, in the morning and at night.   ¨ Use a toothbrush with soft, rounded-end bristles and a head that is small enough to reach all parts of your teeth and mouth. Replace your toothbrush every 3 to 4 months. ¨ Use a fluoride toothpaste. ¨ Place the brush at a 45-degree angle where the teeth meet the gums. Press firmly, and gently rock the brush back and forth using small circular movements. ¨ Brush chewing surfaces vigorously with short back-and-forth strokes. ¨ Brush your tongue from back to front. · Floss at least once a day. Choose the type and flavor that you like best.  · Have your teeth cleaned by a professional at least twice a year. · Ask your dentist about using an antibacterial mouthwash to help reduce bacteria. · Rinse your mouth with water or chew sugar-free gum after meals if you can't brush your teeth. · Do not smoke or use smokeless tobacco. Tobacco use can cause periodontal disease. When should you call for help? Call your dentist now or seek immediate medical care if:  · You have symptoms of infection, such as:  ¨ Increased pain, swelling, warmth, or redness. ¨ Red streaks leading from the area. ¨ Pus draining from the area. ¨ A fever. Watch closely for changes in your health, and be sure to contact your dentist if:  · You have new or worse tooth pain. · You do not get better as expected. Where can you learn more? Go to http://brown-katie.info/. Enter K677 in the search box to learn more about \"Periodontal Conditions: Care Instructions. \"  Current as of: January 6, 2017  Content Version: 11.3  © 6983-2021 [employer]. Care instructions adapted under license by [employer] (which disclaims liability or warranty for this information). If you have questions about a medical condition or this instruction, always ask your healthcare professional. [place] [address] any warranty or liability for your use of this information.

## 2017-11-15 ENCOUNTER — HOSPITAL ENCOUNTER (EMERGENCY)
Age: 27
Discharge: ANOTHER HOSPITAL | End: 2017-11-16
Attending: EMERGENCY MEDICINE
Payer: MEDICARE

## 2017-11-15 ENCOUNTER — APPOINTMENT (OUTPATIENT)
Dept: GENERAL RADIOLOGY | Age: 27
End: 2017-11-15
Attending: EMERGENCY MEDICINE
Payer: MEDICARE

## 2017-11-15 ENCOUNTER — HOSPITAL ENCOUNTER (EMERGENCY)
Age: 27
Discharge: OTHER INSTITUTION | End: 2017-11-16
Attending: EMERGENCY MEDICINE
Payer: MEDICARE

## 2017-11-15 DIAGNOSIS — J93.0 TENSION PNEUMOTHORAX, SPONTANEOUS: Primary | ICD-10-CM

## 2017-11-15 LAB
ALBUMIN SERPL-MCNC: 2.8 G/DL (ref 3.5–5)
ALBUMIN/GLOB SERPL: 0.5 {RATIO} (ref 1.1–2.2)
ALP SERPL-CCNC: 126 U/L (ref 45–117)
ALT SERPL-CCNC: 23 U/L (ref 12–78)
ANION GAP SERPL CALC-SCNC: 12 MMOL/L (ref 5–15)
AST SERPL-CCNC: 25 U/L (ref 15–37)
BASOPHILS # BLD: 0 K/UL (ref 0–0.1)
BASOPHILS NFR BLD: 0 % (ref 0–1)
BILIRUB SERPL-MCNC: 0.6 MG/DL (ref 0.2–1)
BUN SERPL-MCNC: 6 MG/DL (ref 6–20)
BUN/CREAT SERPL: 6 (ref 12–20)
CALCIUM SERPL-MCNC: 8.2 MG/DL (ref 8.5–10.1)
CHLORIDE SERPL-SCNC: 105 MMOL/L (ref 97–108)
CO2 SERPL-SCNC: 20 MMOL/L (ref 21–32)
CREAT SERPL-MCNC: 1.06 MG/DL (ref 0.55–1.02)
EOSINOPHIL # BLD: 0.1 K/UL (ref 0–0.4)
EOSINOPHIL NFR BLD: 2 % (ref 0–7)
ERYTHROCYTE [DISTWIDTH] IN BLOOD BY AUTOMATED COUNT: 14.9 % (ref 11.5–14.5)
GLOBULIN SER CALC-MCNC: 5.8 G/DL (ref 2–4)
GLUCOSE SERPL-MCNC: 138 MG/DL (ref 65–100)
HCT VFR BLD AUTO: 33.9 % (ref 35–47)
HGB BLD-MCNC: 11.4 G/DL (ref 11.5–16)
INR PPP: 1 (ref 0.9–1.1)
LYMPHOCYTES # BLD: 2.6 K/UL (ref 0.8–3.5)
LYMPHOCYTES NFR BLD: 37 % (ref 12–49)
MCH RBC QN AUTO: 26.9 PG (ref 26–34)
MCHC RBC AUTO-ENTMCNC: 33.6 G/DL (ref 30–36.5)
MCV RBC AUTO: 80 FL (ref 80–99)
MONOCYTES # BLD: 0.8 K/UL (ref 0–1)
MONOCYTES NFR BLD: 12 % (ref 5–13)
NEUTS SEG # BLD: 3.5 K/UL (ref 1.8–8)
NEUTS SEG NFR BLD: 49 % (ref 32–75)
PLATELET # BLD AUTO: 174 K/UL (ref 150–400)
POTASSIUM SERPL-SCNC: 2.9 MMOL/L (ref 3.5–5.1)
PROT SERPL-MCNC: 8.6 G/DL (ref 6.4–8.2)
PROTHROMBIN TIME: 10.1 SEC (ref 9–11.1)
RBC # BLD AUTO: 4.24 M/UL (ref 3.8–5.2)
SODIUM SERPL-SCNC: 137 MMOL/L (ref 136–145)
WBC # BLD AUTO: 7 K/UL (ref 3.6–11)

## 2017-11-15 PROCEDURE — 85610 PROTHROMBIN TIME: CPT | Performed by: EMERGENCY MEDICINE

## 2017-11-15 PROCEDURE — 99285 EMERGENCY DEPT VISIT HI MDM: CPT

## 2017-11-15 PROCEDURE — 96361 HYDRATE IV INFUSION ADD-ON: CPT

## 2017-11-15 PROCEDURE — 86900 BLOOD TYPING SEROLOGIC ABO: CPT | Performed by: EMERGENCY MEDICINE

## 2017-11-15 PROCEDURE — 96360 HYDRATION IV INFUSION INIT: CPT

## 2017-11-15 PROCEDURE — 74011250636 HC RX REV CODE- 250/636

## 2017-11-15 PROCEDURE — 85025 COMPLETE CBC W/AUTO DIFF WBC: CPT | Performed by: EMERGENCY MEDICINE

## 2017-11-15 PROCEDURE — 80053 COMPREHEN METABOLIC PANEL: CPT | Performed by: EMERGENCY MEDICINE

## 2017-11-15 PROCEDURE — 77030013033 HC MSK BPAP/CPAP MMKA -B

## 2017-11-15 PROCEDURE — 75810000165 HC THORACENTESIS

## 2017-11-15 PROCEDURE — C1729 CATH, DRAINAGE: HCPCS

## 2017-11-15 PROCEDURE — 74011250636 HC RX REV CODE- 250/636: Performed by: EMERGENCY MEDICINE

## 2017-11-15 PROCEDURE — 36415 COLL VENOUS BLD VENIPUNCTURE: CPT | Performed by: EMERGENCY MEDICINE

## 2017-11-15 PROCEDURE — 71010 XR CHEST PORT: CPT

## 2017-11-15 PROCEDURE — 75810000275 HC EMERGENCY DEPT VISIT NO LEVEL OF CARE

## 2017-11-15 RX ORDER — PROPOFOL 10 MG/ML
1 INJECTION, EMULSION INTRAVENOUS
Status: COMPLETED | OUTPATIENT
Start: 2017-11-15 | End: 2017-11-15

## 2017-11-15 RX ORDER — PROPOFOL 10 MG/ML
INJECTION, EMULSION INTRAVENOUS
Status: COMPLETED
Start: 2017-11-15 | End: 2017-11-15

## 2017-11-15 RX ADMIN — SODIUM CHLORIDE 1000 ML: 900 INJECTION, SOLUTION INTRAVENOUS at 23:30

## 2017-11-15 RX ADMIN — PROPOFOL 100 MG: 10 INJECTION, EMULSION INTRAVENOUS at 23:21

## 2017-11-16 ENCOUNTER — APPOINTMENT (OUTPATIENT)
Dept: GENERAL RADIOLOGY | Age: 27
End: 2017-11-16
Attending: EMERGENCY MEDICINE
Payer: MEDICARE

## 2017-11-16 VITALS
HEART RATE: 87 BPM | DIASTOLIC BLOOD PRESSURE: 71 MMHG | OXYGEN SATURATION: 100 % | WEIGHT: 208.78 LBS | RESPIRATION RATE: 35 BRPM | TEMPERATURE: 98.3 F | SYSTOLIC BLOOD PRESSURE: 114 MMHG

## 2017-11-16 VITALS — HEART RATE: 88 BPM | OXYGEN SATURATION: 100 %

## 2017-11-16 LAB
ABO + RH BLD: NORMAL
BLOOD BANK CMNT PATIENT-IMP: NORMAL

## 2017-11-16 PROCEDURE — 77030037656 HC DRN CHST TU PLEURAGLD KT GTNG -B

## 2017-11-16 PROCEDURE — 74011250637 HC RX REV CODE- 250/637: Performed by: EMERGENCY MEDICINE

## 2017-11-16 PROCEDURE — 96365 THER/PROPH/DIAG IV INF INIT: CPT

## 2017-11-16 PROCEDURE — 77030021668 HC NEB PREFIL KT VYRM -A

## 2017-11-16 PROCEDURE — 71010 XR CHEST PORT: CPT

## 2017-11-16 PROCEDURE — 74011250636 HC RX REV CODE- 250/636: Performed by: EMERGENCY MEDICINE

## 2017-11-16 PROCEDURE — 96361 HYDRATE IV INFUSION ADD-ON: CPT

## 2017-11-16 PROCEDURE — 77030012390 HC DRN CHST BTL GTNG -B

## 2017-11-16 RX ORDER — ACETAMINOPHEN 325 MG/1
TABLET ORAL
Status: DISCONTINUED
Start: 2017-11-16 | End: 2017-11-16 | Stop reason: HOSPADM

## 2017-11-16 RX ORDER — ACETAMINOPHEN 500 MG
1000 TABLET ORAL ONCE
Status: COMPLETED | OUTPATIENT
Start: 2017-11-16 | End: 2017-11-16

## 2017-11-16 RX ORDER — POTASSIUM CHLORIDE 7.45 MG/ML
INJECTION INTRAVENOUS
Status: DISCONTINUED
Start: 2017-11-16 | End: 2017-11-16 | Stop reason: HOSPADM

## 2017-11-16 RX ORDER — PROPOFOL 10 MG/ML
200 INJECTION, EMULSION INTRAVENOUS
Status: COMPLETED | OUTPATIENT
Start: 2017-11-16 | End: 2017-11-16

## 2017-11-16 RX ORDER — POTASSIUM CHLORIDE 7.45 MG/ML
10 INJECTION INTRAVENOUS
Status: COMPLETED | OUTPATIENT
Start: 2017-11-16 | End: 2017-11-16

## 2017-11-16 RX ORDER — PROPOFOL 10 MG/ML
INJECTION, EMULSION INTRAVENOUS
Status: DISCONTINUED
Start: 2017-11-16 | End: 2017-11-16 | Stop reason: HOSPADM

## 2017-11-16 RX ADMIN — ACETAMINOPHEN 1000 MG: 500 TABLET ORAL at 02:07

## 2017-11-16 RX ADMIN — POTASSIUM CHLORIDE 10 MEQ: 10 INJECTION, SOLUTION INTRAVENOUS at 01:50

## 2017-11-16 RX ADMIN — PROPOFOL 200 MG: 10 INJECTION, EMULSION INTRAVENOUS at 00:11

## 2017-11-16 NOTE — ED NOTES
Pt's RR is ranging from 40-52. Discussed withMD Rodrigue. Tru Tran MD came to the bedside and visualized the pt and listened to the pt's lung sounds.

## 2017-11-16 NOTE — PROGRESS NOTES
Spiritual Care Assessment/Progress Notes    Kym Khan [id number]  [id number]    1990  28 y.o.  female    Patient Telephone Number: [phone] (home)   Yazidi Affiliation: Eldon Buck   Language: English   Extended Emergency Contact Information  Primary Emergency Contact: Zeb Del Cid  Address: [address] [place], [address] [place] [address] [address] Phone: [phone]  Relation: Mother   There are no active problems to display for this patient. Date: 11/16/2017       Level of Yazidi/Spiritual Activity:  []         Involved in yolanda tradition/spiritual practice    []         Not involved in yolanda tradition/spiritual practice  []         Spiritually oriented    []         Claims no spiritual orientation    []         seeking spiritual identity  []         Feels alienated from Quaker practice/tradition  []         Feels angry about Quaker practice/tradition  []         Spirituality/Quaker tradition  a resource for coping at this time.   [x]         Not able to assess due to medical condition    Services Provided Today:  []         crisis intervention    []         reading Scriptures  []         spiritual assessment    []         prayer  [x]         empathic listening/emotional support  []         rites and rituals (cite in comments)  []         life review     []         Quaker support  []         theological development   []         advocacy  []         ethical dialog     [x]         blessing  []         bereavement support    [x]         support to family  []         anticipatory grief support   []         help with AMD  []         spiritual guidance    []         meditation      Spiritual Care Needs  [x]         Emotional Support  []         Spiritual/Yazidi Care  []         Loss/Adjustment  []         Advocacy/Referral                /Ethics  []         No needs expressed at               this time  []         Other: (note in               comments)  Spiritual Care Plan  []         Follow up visits with               pt/family  []         Provide materials  []         Schedule sacraments  []         Contact Community               Clergy  [x]         Follow up as needed  []         Other: (note in               comments)     Comments:   Responded to request from staff to see pt with 22w fetus and collapsed lung. Provided support to family and facilitated visits of pt's sisters, Mehreen Oconnorr and Robyn Kelley, and pt's mother. Family was comforted by seeing pt and holding her hand. Provided blessing for a peaceful unfolding of the rest of the evening/morning. Family shared their appreciation.  stepped out at this point.       Liza Munguia.

## 2017-11-16 NOTE — ED NOTES
MD Rodrigue removed the previous chest tube due to innapropiate placement per MD Rodrigue. Michael Hermosillo to reinsert tube.

## 2017-11-16 NOTE — PROGRESS NOTES
All fetal monitoring is documented under pt \"Keli Spangler\"  1990, MRN # [id number]. See that chart for additional information.

## 2017-11-16 NOTE — ED NOTES
MD Rodrigue explained chest xray to pt being 34 weeks pregnant. Pt stated her head acknowledging she was okay to receive the chest xray. MD Rodrigue and Vincent Sarah, charge nurse signed consent.

## 2017-11-16 NOTE — ED NOTES
TRANSFER - OUT REPORT:    Verbal report given to Rex Mcintosh RN(name) on Danielle Ross  being transferred to [address] [place] [address] Cardiac Surgery ICU (unit) for urgent transfer       Report consisted of patients Situation, Background, Assessment and   Recommendations(SBAR). Information from the following report(s) SBAR, Kardex, ED Summary, [facility] and Recent Results was reviewed with the receiving nurse. Lines:   Peripheral IV 11/15/17 Right Antecubital (Active)   Site Assessment Clean, dry, & intact 11/15/2017 11:16 PM   Phlebitis Assessment 0 11/15/2017 11:16 PM   Infiltration Assessment 0 11/15/2017 11:16 PM   Dressing Status Clean, dry, & intact 11/15/2017 11:16 PM   Dressing Type Transparent 11/15/2017 11:16 PM   Hub Color/Line Status Pink;Flushed 11/15/2017 11:16 PM   Action Taken Dressing reinforced;Blood drawn 11/15/2017 11:16 PM       Peripheral IV 11/15/17 Left Antecubital (Active)   Site Assessment Clean, dry, & intact 11/15/2017 11:19 PM   Phlebitis Assessment 0 11/15/2017 11:19 PM   Infiltration Assessment 0 11/15/2017 11:19 PM   Dressing Status Clean, dry, & intact 11/15/2017 11:19 PM   Dressing Type Transparent 11/15/2017 11:19 PM   Hub Color/Line Status Green;Flushed 11/15/2017 11:19 PM   Action Taken Dressing reinforced 11/15/2017 11:19 PM        Opportunity for questions and clarification was provided.       Patient transported with:   [address] [place] [address] Road [employer]

## 2017-11-16 NOTE — PROGRESS NOTES
2350-Called to ED to obtain [facility] TUSHAR on pt w/dx of tension pneumo-thorax. Dr. Jeff Wahl notified. Orders received. 2355-FHTs 155, movement noted. 0000-150    This chart to be merged with \"Kalina Vogte\"   11/15/1988, MRN # [id number].    0147-Transport team arrived.

## 2017-11-16 NOTE — ED NOTES
Pt's sheet around the chest tube was saturated with blood. Pt urinated during chest tube procedure. Changed pt's sheets and performed yari care. MD Rodrigue performed a vaginal exam. MD Rodrigue visualized pt's chest tube dressing. No orders received at this time.

## 2017-11-16 NOTE — ED NOTES
Fetal heart tones performed by Sharon MÉNDEZOB RN. Fetal heart tones 145. OB, RN's at the bedside continuously monitoring the fetal HR.

## 2017-11-16 NOTE — ED PROVIDER NOTES
[facility] [address]  EMERGENCY DEPARTMENT HISTORY AND PHYSICAL EXAM       Date of Service: 11/15/2017   Patient Name: Zorita Kayser   YOB: 1990  Medical Record Number: [id number]    History of Presenting Illness     Chief Complaint   Patient presents with    Shortness of Breath     pneumothorax on the right side        History Provided By:  Patient and EMS    Additional History:   Zorita Kayser is a 28 y.o. female with who presents via EMS to the ED c/o sudden onset respiratory distress x PTA. Per EMS pt was found with blood pressure of 146/90 and oxygen saturation of 50% on RA. Pt was placed on NRB with improvement to 70%. Pt given Duo-neb treatment en route and placed on BiPAP with improvement to 80%. Of note, the pt is 34 weeks gravid. PMHx: Significant for Sarcoidosis of lung   PSHx: Significant for none  Social Hx: -tobacco, -EtOH, -Illicit Drugs     History is limited due to pt's serve respiratory distress. Primary Care Provider: None     Past History     Past Medical History:   Past Medical History:   Diagnosis Date    Sarcoidosis of lung ([facility] [address]     diagnosed 2005        Past Surgical History:   No past surgical history on file. Family History:   No family history on file. Social History:   Social History   Substance Use Topics    Smoking status: Never Smoker    Smokeless tobacco: Never Used    Alcohol use No        Allergies:   No Known Allergies      Review of Systems   Review of Systems   Unable to perform ROS: Severe respiratory distress       Physical Exam  Physical Exam   Constitutional: She is oriented to person, place, and time. She appears well-developed and well-nourished. She appears distressed. Young non-verbal female due to acute respiratory distress. HENT:   Head: Normocephalic and atraumatic. Eyes: Conjunctivae and EOM are normal. Pupils are equal, round, and reactive to light. Right eye exhibits no discharge.  Left eye exhibits no discharge. Neck: Normal range of motion. Neck supple. JVD (Right sided) present. Cardiovascular: Regular rhythm and normal heart sounds. Tachycardia present. No murmur heard. Pulmonary/Chest: Accessory muscle usage present. Tachypnea noted. She is in respiratory distress. She has decreased breath sounds (Abscense of right sided breath sounds anterior and posterior) in the right upper field, the right middle field and the right lower field. She has no wheezes. She has no rhonchi. She has no rales. Abdominal: Soft. Bowel sounds are normal. She exhibits no distension. There is no tenderness. Gravid uterus   Genitourinary: No bleeding in the vagina. No vaginal discharge found. Genitourinary Comments: Vaginal exam: no evidence of cervical dilation   Musculoskeletal: Normal range of motion. She exhibits edema (Trace BLE). She exhibits no tenderness. Neurological: She is alert and oriented to person, place, and time. No cranial nerve deficit. She exhibits normal muscle tone. Skin: Skin is warm and dry. No rash noted. She is not diaphoretic. Psychiatric: Her mood appears anxious. Nursing note and vitals reviewed. Medical Decision Making   I am the first provider for this patient. I reviewed the vital signs, available nursing notes, past medical history, past surgical history, family history and social history. Old Medical Records: Old medical records. Nursing notes. Ambulance run sheet. Provider notes:  DDx:  Concerning exam for acute tension pneumothorax. Needle decompressed with improvement in saturation and decrease in respiratory distress as well as decreased HR and increased sbp. Pt is now  able to speak 2-3 word sentences. Permanent chest tube to be placed. Will consult with [facility] hospitalist for monitoring and transfer for cardiothoracic surgery.     Critical Care Time:   CRITICAL CARE NOTE :    11:13 PM  IMPENDING DETERIORATION -Airway, Respiratory, Cardiovascular and Metabolic  ASSOCIATED RISK FACTORS - Hypotension, Shock, Hypoxia and Dysrhythmia  MANAGEMENT- Bedside Assessment, Supervision of Care and Transfer  INTERPRETATION -  Xrays, ECG and Blood Pressure  INTERVENTIONS - hemodynamic mngmt and needle decompression and chest tube  CASE REVIEW - Medical Sub-Specialist, Nursing and Family  TREATMENT RESPONSE -Improved and Stable  PERFORMED BY - Self    NOTES   :  I have spent 150 minutes of critical care time involved in lab review, consultations with specialist, family decision- making, bedside attention and documentation. During this entire length of time I was immediately available to the patient . Mira Hahn MD    ED Course:  11:07 PM   Initial assessment performed. The patients presenting problems have been discussed, and they are in agreement with the care plan formulated and outlined with them. I have encouraged them to ask questions as they arise throughout their visit. Progress Notes:   11:00  Mira Hahn MD at bedside performing needle decompression for spontaneous pneumothorax. Pt at risk for cardiovascular collapse. Using betadine to cleanse the skin a 14ga angiocatheter was placed in the Right 1st intercostal space mid clavicular line. There was immediate return of breath sounds on the right side. Stats improved from 86% to 92% with deep breaths and coughing. 11:03 AM  Pt placed on 15L NRB stats at 100%      11:13 AM  Chest X-ray Large right tension pneumothorax (after decompression). 11:14 PM  Kanika Barakat MD at beside to assist in sedation for chest tube placement. 11:21 PM  Mother at beside. Explained situation to family and updated them regarding patient needs and events. Procedure Note - Procedural Sedation:   11:21 PM  Procedure performed by: Kanika Barakat MD  Procedural sedation has been advised for this patient associated with chest tube placement.       PLAN FOR SEDATION:  The patients sedation plan includes a total of 200 mg propofol/DIPRIVAN. Reversal agents and resuscitation equipment will be at the bedside should they be needed. The risks, benefits, and alternatives have been explained to the patient and She consents to the sedation. The ASA score is ASA 1 - Normal, healthy patient and 34 weeks pregnant. The patient is having all vital signs monitored by an attending RN as well as pulse oximetry. Mallampati Score Reference: The patient has a patent airway with a Mallampati Classification Score of II (soft palate, uvula, fauces visible). ________________________________________________________________________________________________________________________________________    Post Procedure Anaesthesia/Sedation Assessment #1  TIME: 11:35   Post Procedure assessment performed by: Governor Danny MD   The patient was sedated with a total of 100mg propofol/DIPRIVAN. Reversal agents and resuscitation equipment were at the bedside. The thoracotomy was dcompleted and the patient tolerated the procedure well with no complications. Reversal agents were not used. HR:110      Rhythm:sinus BP: 110/71  RR: 30                 SpO2:100% on NRB mask  Airway patent?: yes    Intraprocedure/postprocedure Complications: none  EBL: 5ml  The procedure took 20 minutes, and pt tolerated well. During this post sedation period, in addition to the recovering RN, there has been at least one other staff member in the unit. This additional staff member was able to directly hear a call for assistance in the case of an emergency.     ----------------------------------------------------      Procedure Note - Chest Tube Placement:   11:26 PM  Performed by: Krista Coleman. MD Rodrigue    Immediately prior to the procedure, the patient was reevaluated and found suitable for the planned procedure and any planned medications.     Immediately prior to the procedure a time out was called to verify the correct patient, procedure, equipment, staff, and marking as appropriate. Area was prepped with Chlorprep and draped in sterile fashion. Area was anesthetized with 10 mLs of lidocaine 1% without epinephrine, being careful to anesthetize the pleura as well as the area surrounding the intercostal space and rib. A 2 cm incision was made to the 3-4 space with a #11 blade. trochar was used to the parietal pleura and a 22 tube was placed toward the apex region. The tube was secured in place. Patient tolerated the procedure well with no complications. Estimated blood loss: 5ml  Procedure took 16-30 minutes, and pt tolerated well.    11:27 AM  Bedside US is at bedside to monitor Fetal heart tones. 11:29 PM  L&D has been called to monitor fetal heart tones. 11:30 PM  HR: 109 O2: 100 BP: 94/58 RR:44    11:34 PM  OB nurses at bedside, updated on pt HPI. Attempting to monitor fetal heart tones. 11:36 PM  Fetal heart tones taken at 145bpm.     11:37 PM   Stat portable x-ray ordered. PTX unchanged as CT appears to be in the right axilla and has not entered the chest cavity. Pt again decompensating with increased HR and RR. Discussed risks. Will replace tube. 11:38 PM  HR: 102 O2: 100% BP: 106/86 RR:52    11:44 PM  Per nursing staff pt's RR is 52.    11:47 PM   [facility] transfer center was called, for possible transfer to [facility]. Per [facility] there is no Cardiothoracic on call for the [employer]. Will call to transfer the pt to [facility].     11:55 PM  HR: 115 RR:37 O2:100  Chest tube removed. Needle decompression repeated. Saturaion remains stable, HR and RR decreasing. Consult Note:  12:06 AM  Susan Heart MD spoke with Dr. Corey Pal,  Specialty: Cardiothoracic surgeon at [address] [place] [address]  Discussed pt's hx, disposition, and available diagnostic and imaging results. Reviewed care plans. Consultant agrees with plans as outlined.  He notes there are no beds available at the moment, but he will call back when there is one.    12:10 AM   Mark Lopez MD at bedside to replace chest tube. Procedure Note - Procedural Sedation:   12:11 AM  Procedure performed by: Heide Purdy MD  Procedural sedation has been advised for this patient associated with a tension pneumothorax. PLAN FOR SEDATION:  The patients sedation plan includes a total of 200mg propofol/DIPRIVAN. Reversal agents and resuscitation equipment will be at the bedside should they be needed. The risks, benefits, and alternatives have been explained to the patient and She consents to the sedation. The ASA score is ASA 1 - Normal, healthy patient. The patient is having all vital signs monitored by an attending RN as well as pulse oximetry. Mallampati Score Reference: The patient has a patent airway with a Mallampati Classification Score of II (soft palate, uvula, fauces visible). ____________________________________________________________________________________________________________________________________________    Post Procedure Anaesthesia/Sedation Assessment #1  TIME: 00:30  Post Procedure assessment performed by: Mark Lopez MD   The patient was sedated with a total of 200mg propofol/DIPRIVAN. Reversal agents and resuscitation equipment were at the bedside. The thoracotomy was done and the patient tolerated the procedure well with no complications. Reversal agents were not used. HR 96 RR 34 O2 100%    Intraprocedure/postprocedure Complications: increased blood output from tube and chest wall  EBL: 100ml  The procedure took 15 minutes, and pt tolerated well. During this post sedation period, in addition to the recovering RN, there has been at least one other staff member in the unit. This additional staff member was able to directly hear a call for assistance in the case of an emergency. ---------------------------------------------------------------------------------------------------------------------    Procedure Note - Chest Tube Placement:   12:12 AM    Performed by: Stefano Severance MD  Immediately prior to the procedure, the patient was reevaluated and found suitable for the planned procedure and any planned medications. Immediately prior to the procedure a time out was called to verify the correct patient, procedure, equipment, staff, and marking as appropriate. Area was prepped with Chlorprep and draped in sterile fashion. Area was anesthetized with 10 mLs of lidocaine 1% without epinephrine, being careful to anesthetize the pleura as well as the area surrounding the intercostal space and rib. A 2 cm incision was made to the 3-4 space with a #11 blade. Clamp was used to penetrate the parietal and visceral pleura. A 22 tube was placed toward the apex region. The tube was secured in place. Patient tolerated the procedure well with no complications. Estimated blood loss:100ml  Procedure took 16-30 minutes, and pt tolerated well. 12:12 AM   RR 48 O2 100%    12:13 AM  Another 100mg of propofol administered. OB nurse at beside monitoring fetal heart tones. 12:24 AM  HR 96 RR 34 O2 100%  Confirmation of tube in place. Another portable x-ray ordered. 12:37 AM  Transfer center called back with bed assignment for the pt. She has been accepted at [facility], by Cardiothoracic Surgeon Dr. Jody Gowers. 1:00 Continues to remain stable. VS less concerning, pt appears in pain. Discussed medications. She will like to hold opiates for now. OB remains at bedside. Fetus reactive appropriately. 01:30 Transport at bedside. Chest tube site notable for increased bleeding. Dressings removed. Large hematoma noted in axilla without obvious source of external bleeding. Compression held, wound redressed. Medics transferring critical care to [facility].      Diagnostic Study Results   Labs -  No results found for this or any previous visit (from the past 12 hour(s)). Radiologic Studies -  The following have been ordered and reviewed:  No orders to display     CT Results  (Last 48 hours)    None        CXR Results  (Last 48 hours)    None          Vital Signs-Reviewed the patient's vital signs. Patient Vitals for the past 12 hrs:   Pulse SpO2   11/16/17 0130 88 100 %       Medications Given in the ED:  Medications   propofol (DIPRIVAN) 10 mg/mL injection (not administered)   acetaminophen (TYLENOL) 325 mg tablet (not administered)   acetaminophen (TYLENOL) 325 mg tablet (not administered)       Cardiac Monitor:   Rate: 140  Rhythm: Sinus Tachycardia      Diagnosis   Clinical Impression:   1. Tension pneumothorax, spontaneous           Disposition Note:  PLAN:  1. The patient will be transferred to [facility] for further evaluation. Transfer Note:  Pt is being transferred to [facility] for admission. Pt has been accepted by Dr. Velma Keyes. .  Written by Hosea Corrigan ED Scribe, as dictated by Hollie Hollins MD.     _______________________________   This note is prepared by Hosea Corrigan, acting as Scribe for Hollie Hollins MD      The scribe's documentation has been prepared under my direction and personally reviewed by me in its entirety. I confirm that the note above accurately reflects all work, treatment, procedures, and medical decision making performed by me.   Hollie Hollins MD            _______________________________

## 2017-11-16 NOTE — ED PROVIDER NOTES
[address]  EMERGENCY DEPARTMENT HISTORY AND PHYSICAL EXAM       Date of Service: 11/15/2017   Patient Name: Maulik Brice   YOB: 1988  Medical Record Number: [id number]    History of Presenting Illness          NOTE: THIS IS A DUPLICATE NOTE. PLEASE SEE NOTE DATE DURING SAME ENCOUNTER         Review of Systems   Review of Systems   Unable to perform ROS: Severe respiratory distress       Physical Exam  Physical Exam   Constitutional: She is oriented to person, place, and time. She appears well-developed and well-nourished. HENT:   Head: Normocephalic and atraumatic. Mouth/Throat: Oropharynx is clear and moist.   Eyes: Conjunctivae and EOM are normal. Pupils are equal, round, and reactive to light. Right eye exhibits no discharge. Left eye exhibits no discharge. Neck: Normal range of motion. Neck supple. Cardiovascular: Normal rate, regular rhythm and normal heart sounds. No murmur heard. Pulmonary/Chest: Effort normal and breath sounds normal. No respiratory distress. She has no wheezes. She has no rales. Abdominal: Soft. Bowel sounds are normal. She exhibits no distension. There is no tenderness. Musculoskeletal: Normal range of motion. She exhibits no edema. Neurological: She is alert and oriented to person, place, and time. No cranial nerve deficit. She exhibits normal muscle tone. Skin: Skin is warm and dry. No rash noted. She is not diaphoretic. Nursing note and vitals reviewed.

## 2017-11-16 NOTE — ED NOTES
Called [employer], spoke [employer], [place], and set up transport to [facility] Cardiac Surgery ICU Room 148. ETA 45 minutes.

## 2017-11-16 NOTE — ED NOTES
Pt's chest tube dressing saturated with blood. MD Rodrigue at the bedside changing the dressing around the chest tube.

## 2018-09-29 ENCOUNTER — HOSPITAL ENCOUNTER (EMERGENCY)
Age: 28
Discharge: HOME | End: 2018-09-29
Attending: EMERGENCY MEDICINE
Payer: MEDICARE

## 2018-09-29 VITALS
HEIGHT: 65 IN | DIASTOLIC BLOOD PRESSURE: 83 MMHG | BODY MASS INDEX: 36.65 KG/M2 | OXYGEN SATURATION: 93 % | TEMPERATURE: 98.9 F | SYSTOLIC BLOOD PRESSURE: 133 MMHG | WEIGHT: 220 LBS | RESPIRATION RATE: 18 BRPM | HEART RATE: 98 BPM

## 2018-09-29 DIAGNOSIS — L02.412 ABSCESS OF LEFT AXILLA: Primary | ICD-10-CM

## 2018-09-29 PROCEDURE — 75810000289 HC I&D ABSCESS SIMP/COMP/MULT

## 2018-09-29 PROCEDURE — 74011250636 HC RX REV CODE- 250/636: Performed by: PHYSICIAN ASSISTANT

## 2018-09-29 PROCEDURE — 99282 EMERGENCY DEPT VISIT SF MDM: CPT

## 2018-09-29 RX ORDER — NAPROXEN 500 MG/1
500 TABLET ORAL 2 TIMES DAILY WITH MEALS
Qty: 20 TAB | Refills: 0 | OUTPATIENT
Start: 2018-09-29 | End: 2021-03-15

## 2018-09-29 RX ORDER — SULFAMETHOXAZOLE AND TRIMETHOPRIM 800; 160 MG/1; MG/1
1 TABLET ORAL 2 TIMES DAILY
Qty: 20 TAB | Refills: 0 | Status: SHIPPED | OUTPATIENT
Start: 2018-09-29 | End: 2018-10-09

## 2018-09-29 RX ORDER — ACETAMINOPHEN 500 MG
1000 TABLET ORAL
Qty: 20 TAB | Refills: 0 | Status: SHIPPED | OUTPATIENT
Start: 2018-09-29 | End: 2022-01-01

## 2018-09-29 RX ORDER — LIDOCAINE HYDROCHLORIDE 10 MG/ML
10 INJECTION, SOLUTION EPIDURAL; INFILTRATION; INTRACAUDAL; PERINEURAL ONCE
Status: COMPLETED | OUTPATIENT
Start: 2018-09-29 | End: 2018-09-29

## 2018-09-29 RX ADMIN — LIDOCAINE HYDROCHLORIDE 10 ML: 10 INJECTION, SOLUTION EPIDURAL; INFILTRATION; INTRACAUDAL; PERINEURAL at 12:47

## 2018-09-29 NOTE — ED NOTES
Patient states she is here today with an abscess in her left axilla x 3 days. She denies any other complaint

## 2018-09-29 NOTE — DISCHARGE INSTRUCTIONS

## 2018-09-29 NOTE — ED PROVIDER NOTES
EMERGENCY DEPARTMENT HISTORY AND PHYSICAL EXAM 
 
 
Date: 9/29/2018 Patient Name: Radhames Che History of Presenting Illness Chief Complaint Patient presents with  
 Skin Problem History Provided By: Patient HPI: Radhames Che, 33 y.o. female with PMHx significant for sarcoidosis, presents ambulatory to the ED with cc of abscess to left axilla x 1 mo. Pt reports increased pain and swelling over the past few days. Rates pain 10/10. Describes pain as a constant throbbing. Denies numbness/tingling, drainage, fever/chills. Has not taken anything for sx. Denies previous abscess. There are no other complaints, changes, or physical findings at this time. PCP: None Current Outpatient Prescriptions Medication Sig Dispense Refill  trimethoprim-sulfamethoxazole (BACTRIM DS) 160-800 mg per tablet Take 1 Tab by mouth two (2) times a day for 10 days. 20 Tab 0  
 naproxen (NAPROSYN) 500 mg tablet Take 1 Tab by mouth two (2) times daily (with meals). 20 Tab 0  
 acetaminophen (TYLENOL) 325 mg tablet Take 2 Tabs by mouth every four (4) hours as needed for Pain. 20 Tab 0  
 acetaminophen (TYLENOL) 325 mg tablet Take 2 Tabs by mouth every four (4) hours as needed for Pain. 20 Tab 0  
 albuterol (PROVENTIL HFA, VENTOLIN HFA, PROAIR HFA) 90 mcg/actuation inhaler Take 2 Puffs by inhalation every four (4) hours as needed for Wheezing. 1 Inhaler 1  
 azaTHIOprine (IMURAN) 50 mg tablet Take 50 mg by mouth daily.  raNITIdine (ZANTAC) 75 mg tablet Take 1 Tab by mouth two (2) times a day. 14 Tab 0 Past History Past Medical History: 
Past Medical History:  
Diagnosis Date  Ill-defined condition   
 sarcadosis  Sarcoidosis of lung ([facility] [address] diagnosed 2005 Past Surgical History: 
History reviewed. No pertinent surgical history. Family History: 
History reviewed. No pertinent family history. Social History: 
Social History Substance Use Topics  Smoking status: Never Smoker  Smokeless tobacco: Never Used  Alcohol use No  
 
 
Allergies: 
No Known Allergies Review of Systems Review of Systems Constitutional: Negative for chills and fever. Respiratory: Negative for shortness of breath. Cardiovascular: Negative for chest pain. Gastrointestinal: Negative for abdominal pain, nausea and vomiting. Genitourinary: Negative for flank pain. Musculoskeletal: Negative for back pain and myalgias. Skin: Negative for color change, pallor, rash and wound. Lesion to left axilla Neurological: Negative for dizziness, weakness and light-headedness. All other systems reviewed and are negative. Physical Exam  
Physical Exam  
Constitutional: She is oriented to person, place, and time. She appears well-developed and well-nourished. No distress. HENT:  
Head: Normocephalic and atraumatic. Eyes: Conjunctivae are normal.  
Cardiovascular: Normal rate, regular rhythm and normal heart sounds. Pulmonary/Chest: Effort normal and breath sounds normal. No respiratory distress. Abdominal: Soft. Bowel sounds are normal. She exhibits no distension. Musculoskeletal: Normal range of motion. Neurological: She is alert and oriented to person, place, and time. Skin: Skin is warm. No rash noted. 3 cm indurated lesion to left axilla with central fluctuance. TTP. No erythema, warmth, drainage. Psychiatric: She has a normal mood and affect. Her behavior is normal.  
Nursing note and vitals reviewed. Diagnostic Study Results Labs - No results found for this or any previous visit (from the past 12 hour(s)). Radiologic Studies - No orders to display CT Results  (Last 48 hours) None CXR Results  (Last 48 hours) None Medical Decision Making I am the first provider for this patient.  
 
I reviewed the vital signs, available nursing notes, past medical history, past surgical history, family history and social history. Vital Signs-Reviewed the patient's vital signs. Patient Vitals for the past 12 hrs: 
 Temp Pulse Resp BP SpO2  
09/29/18 1307 - 98 18 133/83 93 % 09/29/18 1225 98.9 °F (37.2 °C) 100 16 (!) 163/107 100 % Records Reviewed: Nursing Notes and Old Medical Records Provider Notes (Medical Decision Making): DDx: Abscess, Cyst, Cellulitis ED Course:  
Initial assessment performed. The patients presenting problems have been discussed, and they are in agreement with the care plan formulated and outlined with them. I have encouraged them to ask questions as they arise throughout their visit. I&D ABCESS COMP/MULTIPLE Date/Time: 9/29/2018 1:05 PM 
Performed by: Salma Rivera Authorized by: Salma Rivera Consent:  
  Consent obtained:  Written Consent given by:  Patient Risks discussed:  Bleeding, incomplete drainage, infection and pain Alternatives discussed:  Delayed treatment Location:  
  Type:  Abscess Size:  3 cm Location: left axilla. Pre-procedure details:  
  Skin preparation:  Betadine Anesthesia (see MAR for exact dosages): Anesthesia method:  Local infiltration Local anesthetic:  Lidocaine 1% w/o epi Procedure type:  
  Complexity:  Complex Procedure details:  
  Incision types:  Single straight Incision depth:  Dermal 
  Scalpel blade:  11 Wound management:  Probed and deloculated and irrigated with saline Drainage amount: Moderate Packing materials:  1/4 in gauze Amount 1/4\":  6 cm Post-procedure details:  
  Patient tolerance of procedure: Tolerated well, no immediate complications Disposition: 
1:16 PM 
Discussed dx and treatment plan. Discussed importance of PCP follow up and return in 2 days for wound re-check. All questions answered. Pt voiced they understood. Return if sx worsen. PLAN: 
1.   
Current Discharge Medication List  
  
 START taking these medications Details  
trimethoprim-sulfamethoxazole (BACTRIM DS) 160-800 mg per tablet Take 1 Tab by mouth two (2) times a day for 10 days. Qty: 20 Tab, Refills: 0  
  
naproxen (NAPROSYN) 500 mg tablet Take 1 Tab by mouth two (2) times daily (with meals). Qty: 20 Tab, Refills: 0  
  
  
 
2. Follow-up Information Follow up With Details Comments Contact Info [facility] EMERGENCY DEPT In 2 days For wound re-check [address] Return to ED if worse Diagnosis Clinical Impression: 1. Abscess of left axilla

## 2018-10-01 ENCOUNTER — HOSPITAL ENCOUNTER (EMERGENCY)
Age: 28
Discharge: HOME | End: 2018-10-01
Attending: EMERGENCY MEDICINE
Payer: MEDICARE

## 2018-10-01 VITALS
TEMPERATURE: 98.5 F | OXYGEN SATURATION: 99 % | BODY MASS INDEX: 36.32 KG/M2 | DIASTOLIC BLOOD PRESSURE: 86 MMHG | HEIGHT: 65 IN | SYSTOLIC BLOOD PRESSURE: 120 MMHG | WEIGHT: 218 LBS | RESPIRATION RATE: 18 BRPM | HEART RATE: 86 BPM

## 2018-10-01 DIAGNOSIS — Z51.89 ABSCESS RE-CHECK: Primary | ICD-10-CM

## 2018-10-01 PROCEDURE — 77030019895 HC PCKNG STRP IODO -A

## 2018-10-01 PROCEDURE — 99282 EMERGENCY DEPT VISIT SF MDM: CPT

## 2018-10-01 NOTE — ED TRIAGE NOTES
Patient states she is here today for a follow up visit/wound check of her left axilla from an I & D she had here 2 days ago. She denies any nausea vomiting, diarrhe,a fever, chills or issues with the site

## 2018-10-01 NOTE — ED NOTES
Emergency Department Nursing Plan of Care The Nursing Plan of Care is developed from the Nursing assessment and Emergency Department Attending provider initial evaluation. The plan of care may be reviewed in the ED Provider note. The Plan of Care was developed with the following considerations:  
Patient / Family readiness to learn indicated by:verbalized understanding Persons(s) to be included in education: patient Barriers to Learning/Limitations:No 
 
Signed Marley Hennessy RN   
10/1/2018   10:19 AM

## 2018-10-01 NOTE — ED PROVIDER NOTES
EMERGENCY DEPARTMENT HISTORY AND PHYSICAL EXAM 
 
 
Date: 10/1/2018 Patient Name: Stephan Loco History of Presenting Illness Chief Complaint Patient presents with  Wound Check  
  was seen here on 9/29 for axilla abscess History Provided By: Patient HPI: Stephan Loco, 33 y.o. female with PMHx significant for Sarcoidosis, presents ambulatory to the ED for a wound check. Pt was seen in the ED 2 days ago and had an I&D performed on an abscess to the left axilla. Pt presents today only for a wound check. Pt was started on antibiotics and has been taking them as prescribed and has been tolerating them without difficulty. She notes some mild pain to the site. She has been following all her discharge instructions. She denies any fever, chills, abdominal pain, nausea, vomiting, diarrhea, CP, SOB. Social Hx: - Tobacco (-), - EtOH (-), - illicit drug use (-) There are no other complaints, changes, or physical findings at this time. PCP: None Current Outpatient Prescriptions Medication Sig Dispense Refill  trimethoprim-sulfamethoxazole (BACTRIM DS) 160-800 mg per tablet Take 1 Tab by mouth two (2) times a day for 10 days. 20 Tab 0  
 naproxen (NAPROSYN) 500 mg tablet Take 1 Tab by mouth two (2) times daily (with meals). 20 Tab 0  
 acetaminophen (TYLENOL EXTRA STRENGTH) 500 mg tablet Take 2 Tabs by mouth every six (6) hours as needed for Pain. 20 Tab 0  
 albuterol (PROVENTIL HFA, VENTOLIN HFA, PROAIR HFA) 90 mcg/actuation inhaler Take 2 Puffs by inhalation every four (4) hours as needed for Wheezing. 1 Inhaler 1  
 azaTHIOprine (IMURAN) 50 mg tablet Take 50 mg by mouth daily.  raNITIdine (ZANTAC) 75 mg tablet Take 1 Tab by mouth two (2) times a day. 14 Tab 0 Past History Past Medical History: 
Past Medical History:  
Diagnosis Date  Ill-defined condition   
 sarcadosis  Sarcoidosis of lung ([facility] [address] diagnosed 2005 Past Surgical History: History reviewed. No pertinent surgical history. Family History: 
History reviewed. No pertinent family history. Social History: 
Social History Substance Use Topics  Smoking status: Never Smoker  Smokeless tobacco: Never Used  Alcohol use No  
 
 
Allergies: 
No Known Allergies Review of Systems Review of Systems Constitutional: Negative for chills and fever. HENT: Negative for congestion, rhinorrhea, sneezing and sore throat. Respiratory: Negative for shortness of breath. Cardiovascular: Negative for chest pain. Gastrointestinal: Negative for abdominal pain, nausea and vomiting. Musculoskeletal: Negative for back pain, myalgias and neck stiffness. Skin: Positive for wound (abscess left axilla). Negative for rash. Neurological: Negative for dizziness, weakness and headaches. All other systems reviewed and are negative. Physical Exam  
Physical Exam  
Constitutional: She is oriented to person, place, and time. She appears well-developed and well-nourished. HENT:  
Head: Normocephalic and atraumatic. Mouth/Throat: Oropharynx is clear and moist.  
Eyes: Conjunctivae and EOM are normal.  
Neck: Normal range of motion and full passive range of motion without pain. Neck supple. Cardiovascular: Normal rate, regular rhythm, S1 normal, S2 normal, normal heart sounds, intact distal pulses and normal pulses. No murmur heard. Pulmonary/Chest: Effort normal and breath sounds normal. No respiratory distress. She has no wheezes. Abdominal: Soft. Normal appearance and bowel sounds are normal. She exhibits no distension. There is no tenderness. There is no rebound. Musculoskeletal: Normal range of motion. Neurological: She is alert and oriented to person, place, and time. She has normal strength. Skin: Skin is warm, dry and intact. No rash noted. No erythema. Left axilla abscess I&D, packing in place. There is no surrounding erythema. Psychiatric: She has a normal mood and affect. Her speech is normal and behavior is normal. Judgment and thought content normal.  
Nursing note and vitals reviewed. Diagnostic Study Results Labs - No results found for this or any previous visit (from the past 12 hour(s)). Radiologic Studies - No orders to display CT Results  (Last 48 hours) None CXR Results  (Last 48 hours) None Medical Decision Making I am the first provider for this patient. I reviewed the vital signs, available nursing notes, past medical history, past surgical history, family history and social history. Vital Signs-Reviewed the patient's vital signs. Patient Vitals for the past 12 hrs: 
 Temp Pulse Resp BP SpO2  
10/01/18 1012 98.5 °F (36.9 °C) 86 18 120/86 99 % Records Reviewed: Nursing Notes, Old Medical Records, Previous Radiology Studies and Previous Laboratory Studies Provider Notes (Medical Decision Making): DDx: Wound recheck, abscess. ED Course:  
Initial assessment performed. The patients presenting problems have been discussed, and they are in agreement with the care plan formulated and outlined with them. I have encouraged them to ask questions as they arise throughout their visit. Procedure Note - I&D Wound Check 10:33 AM  
Performed by: Gwen Kate MD 
Packing was removed from the left axilla. There was some purulent drainage. Wound healing well. Packing removed without incident or complications. Packing replaced: yes with 1/4 inch iodoform. Estimated blood loss: less than 5 mL The procedure took 1-15 minutes, and pt tolerated well. Critical Care Time:  
None. Disposition: 
Discharge with PCP follow up. PLAN: 
1. Discharge Medication List as of 10/1/2018 10:35 AM  
  
 
2. Follow-up Information Follow up With Details Comments Contact Info [facility] EMERGENCY DEPT In 2 days  [address] [facility] EMERGENCY DEPT  As needed, If symptoms worsen [address] [facility] [facility] Return to ED if worse Diagnosis Clinical Impression: 1. Abscess re-check

## 2018-10-01 NOTE — DISCHARGE INSTRUCTIONS
Wound Care: After Your Visit  Your Care Instructions  Taking good care of your wound at home will help it heal quickly and reduce your chance of infection. The doctor has checked you carefully, but problems can develop later. If you notice any problems or new symptoms, get medical treatment right away. Follow-up care is a key part of your treatment and safety. Be sure to make and go to all appointments, and call your doctor if you are having problems. It's also a good idea to know your test results and keep a list of the medicines you take. How can you care for yourself at home? · Clean the area with soap and water 2 times a day unless your doctor gives you different instructions. Don't use hydrogen peroxide or alcohol, which can slow healing. ¨ You may cover the wound with a thin layer of antibiotic ointment, such as bacitracin, and a nonstick bandage. ¨ Apply more ointment and replace the bandage as needed. · Take pain medicines exactly as directed. Some pain is normal with a wound, but do not ignore pain that is getting worse instead of better. You could have an infection. ¨ If the doctor gave you a prescription medicine for pain, take it as prescribed. ¨ If you are not taking a prescription pain medicine, ask your doctor if you can take an over-the-counter medicine. · Your doctor may have closed your wound with stitches (sutures), staples, or skin glue. ¨ If you have stitches, your doctor may remove them after several days to 2 weeks. Or you may have stitches that dissolve on their own. ¨ If you have staples, your doctor may remove them after 7 to 10 days. ¨ If your wound was closed with skin glue, the glue will wear off in a few days to 2 weeks. When should you call for help? Call your doctor now or seek immediate medical care if:  · You have signs of infection, such as:  ¨ Increased pain, swelling, warmth, or redness near the wound. ¨ Red streaks leading from the wound.   ¨ Pus draining from the wound. ¨ A fever. · You bleed so much from your incision that you soak one or more bandages over 2 to 4 hours. Watch closely for changes in your health, and be sure to contact your doctor if:  · The wound is not getting better each day. Where can you learn more? Go to [employer]  Enter M973 in the search box to learn more about \"Wound Care: After Your Visit. \"   © 1414-8730 [employer]. Care instructions adapted under license by [facility] (which disclaims liability or warranty for this information). This care instruction is for use with your licensed healthcare professional. If you have questions about a medical condition or this instruction, always ask your healthcare professional. [address] any warranty or liability for your use of this information. Content Version: 58.6.590229;  Last Revised: April 23, 2012

## 2018-10-03 ENCOUNTER — HOSPITAL ENCOUNTER (EMERGENCY)
Age: 28
Discharge: HOME | End: 2018-10-03
Attending: EMERGENCY MEDICINE
Payer: MEDICARE

## 2018-10-03 VITALS
OXYGEN SATURATION: 95 % | TEMPERATURE: 98.2 F | WEIGHT: 218 LBS | RESPIRATION RATE: 18 BRPM | HEIGHT: 65 IN | DIASTOLIC BLOOD PRESSURE: 85 MMHG | BODY MASS INDEX: 36.32 KG/M2 | HEART RATE: 97 BPM | SYSTOLIC BLOOD PRESSURE: 130 MMHG

## 2018-10-03 DIAGNOSIS — Z51.89 WOUND CHECK, ABSCESS: Primary | ICD-10-CM

## 2018-10-03 PROCEDURE — 75810000275 HC EMERGENCY DEPT VISIT NO LEVEL OF CARE

## 2018-10-03 NOTE — ED NOTES
.. 
Emergency Department Nursing Plan of Care The Nursing Plan of Care is developed from the Nursing assessment and Emergency Department Attending provider initial evaluation. The plan of care may be reviewed in the ED Provider note. The Plan of Care was developed with the following considerations:  
Patient / Family readiness to learn indicated by:verbalized understanding and appropriate questions asked Persons(s) to be included in education: patient Barriers to Learning/Limitations:No 
 
Signed Tyler Smith RN   
10/3/2018   12:10 PM

## 2018-10-03 NOTE — ED PROVIDER NOTES
EMERGENCY DEPARTMENT HISTORY AND PHYSICAL EXAM 
 
Date: 10/3/2018 Patient Name: Emily Soler History of Presenting Illness Chief Complaint Patient presents with  Wound Check  
  left axilla wound check, seen here two days ago for abscess, denies fever, chills and drainage from site. History Provided By: Patient HPI: Emily Soler is a 33 y.o. female with a PMH of sarcoidosis who presents with wound check. She received I&D of L axilla abscess on 9/29 (5 days ago). At the time, she was rx bacrim and naprosyn. SHe returned to ED 2 days ago for wound check and had packing reapplied. Today she returns to have packing removed. She reports improvement in wound. No redness, swelling, fever, chills. No drainage. She is taking her abx as prescribed and states she has 5 days left. No pain reported PCP: None Current Outpatient Prescriptions Medication Sig Dispense Refill  trimethoprim-sulfamethoxazole (BACTRIM DS) 160-800 mg per tablet Take 1 Tab by mouth two (2) times a day for 10 days. 20 Tab 0  
 naproxen (NAPROSYN) 500 mg tablet Take 1 Tab by mouth two (2) times daily (with meals). 20 Tab 0  
 acetaminophen (TYLENOL EXTRA STRENGTH) 500 mg tablet Take 2 Tabs by mouth every six (6) hours as needed for Pain. 20 Tab 0  
 albuterol (PROVENTIL HFA, VENTOLIN HFA, PROAIR HFA) 90 mcg/actuation inhaler Take 2 Puffs by inhalation every four (4) hours as needed for Wheezing. 1 Inhaler 1  
 azaTHIOprine (IMURAN) 50 mg tablet Take 50 mg by mouth daily.  raNITIdine (ZANTAC) 75 mg tablet Take 1 Tab by mouth two (2) times a day. 14 Tab 0 Past History Past Medical History: 
Past Medical History:  
Diagnosis Date  Ill-defined condition   
 sarcadosis  Sarcoidosis of lung ([address] diagnosed 2005 Past Surgical History: 
History reviewed. No pertinent surgical history. Family History: 
History reviewed. No pertinent family history. Social History: Social History Substance Use Topics  Smoking status: Never Smoker  Smokeless tobacco: Never Used  Alcohol use No  
 
 
Allergies: 
No Known Allergies Review of Systems Review of Systems Constitutional: Negative for chills and fever. HENT: Negative for congestion and rhinorrhea. Eyes: Negative for pain and itching. Respiratory: Negative for cough and shortness of breath. Cardiovascular: Negative for palpitations and leg swelling. Gastrointestinal: Negative for abdominal pain, nausea and vomiting. Musculoskeletal: Negative for arthralgias and joint swelling. Skin: Positive for wound. Negative for color change. NO drainage , redness, swelling Neurological: Negative for headaches. All other systems reviewed and are negative. Physical Exam  
 
Vitals:  
 10/03/18 1131 BP: 130/85 Pulse: 97 Resp: 18 Temp: 98.2 °F (36.8 °C) SpO2: 95% Weight: 98.9 kg (218 lb) Height: 5' 5\" (1.651 m) Physical Exam  
Constitutional: She is oriented to person, place, and time. She appears well-developed and well-nourished. No distress. HENT:  
Head: Normocephalic and atraumatic. Right Ear: External ear normal.  
Left Ear: External ear normal.  
Nose: Nose normal.  
Mouth/Throat: Oropharynx is clear and moist.  
Eyes: Conjunctivae and EOM are normal. Pupils are equal, round, and reactive to light. Neck: Normal range of motion. Neck supple. Cardiovascular: Normal rate, regular rhythm and normal heart sounds. Pulmonary/Chest: Effort normal and breath sounds normal.  
Musculoskeletal: Normal range of motion. Lymphadenopathy:  
  She has no cervical adenopathy. Neurological: She is alert and oriented to person, place, and time. She has normal reflexes. Skin: Skin is warm and dry. 0.5 cm incision noted to L axilla s/p I&D Packing removed with sanguinous drainage noted No surrounding erythema, swelling or wamth Psychiatric: She has a normal mood and affect. Nursing note and vitals reviewed. Diagnostic Study Results Labs - No results found for this or any previous visit (from the past 12 hour(s)). Radiologic Studies - No orders to display CT Results  (Last 48 hours) None CXR Results  (Last 48 hours) None Medical Decision Making I am the first provider for this patient. I reviewed the vital signs, available nursing notes, past medical history, past surgical history, family history and social history. Vital Signs-Reviewed the patient's vital signs. Records Reviewed: Nursing Notes and Old Medical Records ED Course:  
 
Disposition: 
Discharge DISCHARGE NOTE:  
12:13 PM 
 
  Care plan outlined and precautions discussed. Patient has no new complaints, changes, or physical findings. All medications were reviewed with the patient and will continue bactrim. All of pt's questions and concerns were addressed. Patient was instructed and agrees to follow up with PCP, as well as to return to the ED upon further deterioration. Patient is ready to go home. Follow-up Information Follow up With Details Comments Contact Info [facility]  As needed [address] [place] [address] 
[phone] Current Discharge Medication List  
  
CONTINUE these medications which have NOT CHANGED Details  
trimethoprim-sulfamethoxazole (BACTRIM DS) 160-800 mg per tablet Take 1 Tab by mouth two (2) times a day for 10 days. Qty: 20 Tab, Refills: 0  
  
naproxen (NAPROSYN) 500 mg tablet Take 1 Tab by mouth two (2) times daily (with meals). Qty: 20 Tab, Refills: 0  
  
acetaminophen (TYLENOL EXTRA STRENGTH) 500 mg tablet Take 2 Tabs by mouth every six (6) hours as needed for Pain. Qty: 20 Tab, Refills: 0  
  
albuterol (PROVENTIL HFA, VENTOLIN HFA, PROAIR HFA) 90 mcg/actuation inhaler Take 2 Puffs by inhalation every four (4) hours as needed for Wheezing. Qty: 1 Inhaler, Refills: 1 azaTHIOprine (IMURAN) 50 mg tablet Take 50 mg by mouth daily. raNITIdine (ZANTAC) 75 mg tablet Take 1 Tab by mouth two (2) times a day. Qty: 14 Tab, Refills: 0 Provider Notes (Medical Decision Making): DDX: cellulitis, wound recheck s/p abcess. Delayed wound healing Procedures: 
Procedures Diagnosis Clinical Impression:  
1. Wound check, abscess

## 2018-10-03 NOTE — DISCHARGE INSTRUCTIONS
Wound Check: Care Instructions  Your Care Instructions  People have wounds that need care for many reasons. You may have a cut that needs care after surgery. You may have a cut or puncture wound from an accident. Or you may have a wound because of a condition like diabetes. Whatever the cause of your wound, there are things you can do to care for it at home. Your doctor may also want you to come back for a wound check. The wound check lets the doctor know how your wound is healing and if you need more treatment. Follow-up care is a key part of your treatment and safety. Be sure to make and go to all appointments, and call your doctor if you are having problems. It's also a good idea to know your test results and keep a list of the medicines you take. How can you care for yourself at home? · If your doctor told you how to care for your wound, follow your doctor's instructions. If you did not get instructions, follow this general advice:  ¨ You may cover the wound with a thin layer of petroleum jelly, such as Vaseline, and a nonstick bandage. ¨ Apply more petroleum jelly and replace the bandage as needed. · Keep the wound dry for the first 24 to 48 hours. After this, you can shower if your doctor okays it. Pat the wound dry. · Be safe with medicines. Read and follow all instructions on the label. ¨ If the doctor gave you a prescription medicine for pain, take it as prescribed. ¨ If you are not taking a prescription pain medicine, ask your doctor if you can take an over-the-counter medicine. · If your doctor prescribed antibiotics, take them as directed. Do not stop taking them just because you feel better. You need to take the full course of antibiotics. · If you have stitches, do not remove them on your own. Your doctor will tell you when to come back to have them removed. · If you have Steri-Strips, leave them on until they fall off.   · If possible, prop up the injured area on a pillow anytime you sit or lie down during the next 3 days. Try to keep it above the level of your heart. This will help reduce swelling. When should you call for help? Call your doctor now or seek immediate medical care if:     · You have new pain, or the pain gets worse.      · The skin near the wound is cold or pale or changes color.      · You have tingling, weakness, or numbness near the wound.      · The wound starts to bleed, and blood soaks through the bandage. Oozing small amounts of blood is normal.      · You have symptoms of infection, such as:  ¨ Increased pain, swelling, warmth, or redness. ¨ Red streaks leading from the wound. ¨ Pus draining from the wound. ¨ A fever.    Watch closely for changes in your health, and be sure to contact your doctor if:     · You do not get better as expected. Where can you learn more? Go to http://brown-katie.info/. Enter P342 in the search box to learn more about \"Wound Check: Care Instructions. \"  Current as of: November 20, 2017  Content Version: 11.7  © 5876-4361 [employer]. Care instructions adapted under license by [employer] (which disclaims liability or warranty for this information).  If you have questions about a medical condition or this instruction, always ask your healthcare professional. [address] any warranty or liability for your use of this information.

## 2018-11-10 ENCOUNTER — HOSPITAL ENCOUNTER (EMERGENCY)
Age: 28
Discharge: HOME | End: 2018-11-10
Attending: EMERGENCY MEDICINE
Payer: MEDICARE

## 2018-11-10 VITALS
HEIGHT: 65 IN | HEART RATE: 81 BPM | DIASTOLIC BLOOD PRESSURE: 95 MMHG | OXYGEN SATURATION: 95 % | BODY MASS INDEX: 34.66 KG/M2 | TEMPERATURE: 98.3 F | SYSTOLIC BLOOD PRESSURE: 133 MMHG | WEIGHT: 208 LBS | RESPIRATION RATE: 16 BRPM

## 2018-11-10 DIAGNOSIS — I10 HYPERTENSION, UNSPECIFIED TYPE: ICD-10-CM

## 2018-11-10 DIAGNOSIS — H10.33 ACUTE BACTERIAL CONJUNCTIVITIS OF BOTH EYES: Primary | ICD-10-CM

## 2018-11-10 PROCEDURE — 99282 EMERGENCY DEPT VISIT SF MDM: CPT

## 2018-11-10 RX ORDER — POLYMYXIN B SULFATE AND TRIMETHOPRIM 1; 10000 MG/ML; [USP'U]/ML
1 SOLUTION OPHTHALMIC EVERY 4 HOURS
Qty: 10 ML | Refills: 0 | OUTPATIENT
Start: 2018-11-10 | End: 2021-03-15

## 2018-11-10 RX ORDER — PREDNISONE 10 MG/1
TABLET ORAL
COMMUNITY
End: 2021-03-15

## 2018-11-10 RX ORDER — ERGOCALCIFEROL 1.25 MG/1
50000 CAPSULE ORAL
COMMUNITY
End: 2021-03-15

## 2018-11-11 NOTE — DISCHARGE INSTRUCTIONS
High Blood Pressure: Care Instructions  Your Care Instructions    If your blood pressure is usually above 130/80, you have high blood pressure, or hypertension. That means the top number is 130 or higher or the bottom number is 80 or higher, or both. Despite what a lot of people think, high blood pressure usually doesn't cause headaches or make you feel dizzy or lightheaded. It usually has no symptoms. But it does increase your risk for heart attack, stroke, and kidney or eye damage. The higher your blood pressure, the more your risk increases. Your doctor will give you a goal for your blood pressure. Your goal will be based on your health and your age. Lifestyle changes, such as eating healthy and being active, are always important to help lower blood pressure. You might also take medicine to reach your blood pressure goal.  Follow-up care is a key part of your treatment and safety. Be sure to make and go to all appointments, and call your doctor if you are having problems. It's also a good idea to know your test results and keep a list of the medicines you take. How can you care for yourself at home? Medical treatment  · If you stop taking your medicine, your blood pressure will go back up. You may take one or more types of medicine to lower your blood pressure. Be safe with medicines. Take your medicine exactly as prescribed. Call your doctor if you think you are having a problem with your medicine. · Talk to your doctor before you start taking aspirin every day. Aspirin can help certain people lower their risk of a heart attack or stroke. But taking aspirin isn't right for everyone, because it can cause serious bleeding. · See your doctor regularly. You may need to see the doctor more often at first or until your blood pressure comes down. · If you are taking blood pressure medicine, talk to your doctor before you take decongestants or anti-inflammatory medicine, such as ibuprofen.  Some of these medicines can raise blood pressure. · Learn how to check your blood pressure at home. Lifestyle changes  · Stay at a healthy weight. This is especially important if you put on weight around the waist. Losing even 10 pounds can help you lower your blood pressure. · If your doctor recommends it, get more exercise. Walking is a good choice. Bit by bit, increase the amount you walk every day. Try for at least 30 minutes on most days of the week. You also may want to swim, bike, or do other activities. · Avoid or limit alcohol. Talk to your doctor about whether you can drink any alcohol. · Try to limit how much sodium you eat to less than 2,300 milligrams (mg) a day. Your doctor may ask you to try to eat less than 1,500 mg a day. · Eat plenty of fruits (such as bananas and oranges), vegetables, legumes, whole grains, and low-fat dairy products. · Lower the amount of saturated fat in your diet. Saturated fat is found in animal products such as milk, cheese, and meat. Limiting these foods may help you lose weight and also lower your risk for heart disease. · Do not smoke. Smoking increases your risk for heart attack and stroke. If you need help quitting, talk to your doctor about stop-smoking programs and medicines. These can increase your chances of quitting for good. When should you call for help? Call 911 anytime you think you may need emergency care. This may mean having symptoms that suggest that your blood pressure is causing a serious heart or blood vessel problem. Your blood pressure may be over 180/120.   For example, call 911 if:    · You have symptoms of a heart attack. These may include:  ? Chest pain or pressure, or a strange feeling in the chest.  ? Sweating. ? Shortness of breath. ? Nausea or vomiting. ? Pain, pressure, or a strange feeling in the back, neck, jaw, or upper belly or in one or both shoulders or arms. ? Lightheadedness or sudden weakness.   ? A fast or irregular heartbeat.     · You have symptoms of a stroke. These may include:  ? Sudden numbness, tingling, weakness, or loss of movement in your face, arm, or leg, especially on only one side of your body. ? Sudden vision changes. ? Sudden trouble speaking. ? Sudden confusion or trouble understanding simple statements. ? Sudden problems with walking or balance. ? A sudden, severe headache that is different from past headaches.     · You have severe back or belly pain.    Do not wait until your blood pressure comes down on its own. Get help right away.   Call your doctor now or seek immediate care if:    · Your blood pressure is much higher than normal (such as 180/120 or higher), but you don't have symptoms.     · You think high blood pressure is causing symptoms, such as:  ? Severe headache.  ? Blurry vision.    Watch closely for changes in your health, and be sure to contact your doctor if:    · Your blood pressure measures higher than your doctor recommends at least 2 times. That means the top number is higher or the bottom number is higher, or both.     · You think you may be having side effects from your blood pressure medicine. Where can you learn more? Go to http://brown-katie.info/. Enter F155 in the search box to learn more about \"High Blood Pressure: Care Instructions. \"  Current as of: December 6, 2017  Content Version: 11.8  © 5691-4131 [employer]. Care instructions adapted under license by [employer] (which disclaims liability or warranty for this information). If you have questions about a medical condition or this instruction, always ask your healthcare professional. [place] [address] any warranty or liability for your use of this information. [facility] Departments     For adult and child immunizations, family planning, TB screening, STD testing and women's health services.    [facility]: [place] [phone]      [place] [phone] [facility]   [phone]    [place]   [phone]    [facility]   [phone]    [address] [facility] [phone]      [place] [phone]      [address] [facility]     For primary care services, woman and child wellness, and some clinics providing specialty care. [facility] -- [address] [facility] [phone]   [address] [place] [address] 200 [facility] 36[address] [facility] [phone]   [address] [facility] [address] [phone] [address] [facility] [employer] [address] [place] [address] [id number] [place] [address] [phone]   [address] [facility] [address] [place] [address] [phone]   [facility] [address] [facility] [phone]   [facility] [address] [place] [address] [phone]   [facility]: [facility] [address] [facility], [address] [phone]     [facility] [address] [phone]   [facility] [address] [phone]   [employer]  [address] [facility], [address] [facility] ([email or web address]) [phone]         Sexual Health/Woman Wellness Clinics    For STD/HIV testing and treatment, pregnancy testing and services, men's health, birth control services, LGBT services, and hepatitis/HPV vaccine services. [facility] for [place] [employer] [address] [facility] [address] [facility] [address] [facility] [address] [place] [phone]   [facility] [address], 5th floor [phone]   Pregnancy 3928 [facility] 3550 [facility] [address]  Belita Ear [phone] [address] [facility] 401 [facility],[address]   [phone]   [place]   [phone]   Women, Infant and Children's Services: Will Carrington [phone]       600 [facility]   [phone]   [address]   [facility]     [phone]   [address] Crisis   1212 [facility] [phone]     Local Primary Care Physicians  [facility] [phone]  MD Subha Milner, MD Anthony Alaniz MD [facility] [phone]  Sherie Sainz, [facility]  MD Onofre Farrell, MD Rashid [address] [phone]  Rachel Arce, MD Herb Myers MD [address] [facility] [phone]  Linda Joyce, MD Yoel Cash, MD Allie Bauer MD   [facility] [phone]  MD Erika Alexandre, MD [facility], [facility], NP [address] [phone]  Katerina Tompkins, MD Leopoldo Rueda, MD Cj Linares, MD Ranjith Braden, MD Moiz Siddiqui, MD Kesha Juarez MD   [address] [facility] [phone]  Pat Matthews MD [address] [facility] [phone]  Dayne Olmstead, MD Sergey Hernandez, NP  Bonnie Guerrier, MD Mateusz Hay, MD Desiree Livingston, MD Nora Garcia, MD Cynthia Yan MD   [address] [facility] [phone]  Alfonso Jaffe, MD Bell [place]ola, P  Sandy Fresh, NP  Lawrence Monaco, MD Linda Lucero, MD Ronold Schwab, MD Herman Suggs MD [facility] [phone]  MD Christine Verdugo, MD Naresh Canales, MD Tierra Roldan MD   [address] [phone]  MD Sidney Chahal [facility], [address] [facility] [phone]  MD Oskar Bustamante, MD Rm Michel, MD   [facility] [phone]  Jose Saunders, MD Aleshia Sheppard, MD Jennifer Marquez, MD Felix Haile, MD Babs Brambila, MD Tabitha Martinez, NP  Manuel Pollard, Wilda Zhu [address] [facility] [address]   [phone]  MD Henrique Quevedo MD Yale Crumbly, MD   [address] [facility] [address] [phone]  MD Dougie Theodore, CESAR John, DALLIN John, DALLIN Desir MD Keitha Gaw, NP   Francis Walker,  Miscellaneous:  Millie Desai [phone]          Pinkeye: Care Instructions  Your Care Instructions    Pinkeye is redness and swelling of the eye surface and the conjunctiva (the lining of the eyelid and the covering of the white part of the eye). Pinkeye is also called conjunctivitis. Pinkeye is often caused by infection with bacteria or a virus. Dry air, allergies, smoke, and chemicals are other common causes. Pinkeye often clears on its own in 7 to 10 days. Antibiotics only help if the pinkeye is caused by bacteria. Pinkeye caused by infection spreads easily. If an allergy or chemical is causing pinkeye, it will not go away unless you can avoid whatever is causing it. Follow-up care is a key part of your treatment and safety. Be sure to make and go to all appointments, and call your doctor if you are having problems. It's also a good idea to know your test results and keep a list of the medicines you take. How can you care for yourself at home? · Wash your hands often. Always wash them before and after you treat pinkeye or touch your eyes or face. · Use moist cotton or a clean, wet cloth to remove crust. Wipe from the inside corner of the eye to the outside. Use a clean part of the cloth for each wipe. · Put cold or warm wet cloths on your eye a few times a day if the eye hurts. · Do not wear contact lenses or eye makeup until the pinkeye is gone. Throw away any eye makeup you were using when you got pinkeye. Clean your contacts and storage case.  If you wear disposable contacts, use a new pair when your eye has cleared and it is safe to wear contacts again.  · If the doctor gave you antibiotic ointment or eyedrops, use them as directed. Use the medicine for as long as instructed, even if your eye starts looking better soon. Keep the bottle tip clean, and do not let it touch the eye area. · To put in eyedrops or ointment:  ? Tilt your head back, and pull your lower eyelid down with one finger. ? Drop or squirt the medicine inside the lower lid. ? Close your eye for 30 to 60 seconds to let the drops or ointment move around. ? Do not touch the ointment or dropper tip to your eyelashes or any other surface. · Do not share towels, pillows, or washcloths while you have pinkeye. When should you call for help? Call your doctor now or seek immediate medical care if:    · You have pain in your eye, not just irritation on the surface.     · You have a change in vision or loss of vision.     · You have an increase in discharge from the eye.     · Your eye has not started to improve or begins to get worse within 48 hours after you start using antibiotics.     · Pinkeye lasts longer than 7 days.    Watch closely for changes in your health, and be sure to contact your doctor if you have any problems. Where can you learn more? Go to http://brown-katie.info/. Enter Y392 in the search box to learn more about \"Pinkeye: Care Instructions. \"  Current as of: November 20, 2017  Content Version: 11.8  © 5514-9713 [employer]. Care instructions adapted under license by [employer] (which disclaims liability or warranty for this information). If you have questions about a medical condition or this instruction, always ask your healthcare professional. [place] [address] any warranty or liability for your use of this information.

## 2018-11-11 NOTE — ED NOTES
Bedside and Verbal shift change report given to Gallo Lim  (oncoming nurse) by Nathaly Leach RN  (offgoing nurse). Report included the following information SBAR, Kardex, ED Summary, Intake/Output, MAR and Recent Results.

## 2018-11-11 NOTE — ED NOTES
Patient here with c/o bilateral eye redness. Patient with symptoms x5 days. Patient reports having an 10mo old at home, states frequent diaper changes. Denies fevers. Reports some discharge from her eyes, irritation. Emergency Department Nursing Plan of Care The Nursing Plan of Care is developed from the Nursing assessment and Emergency Department Attending provider initial evaluation. The plan of care may be reviewed in the ED Provider note. The Plan of Care was developed with the following considerations:  
Patient / Family readiness to learn indicated by:verbalized understanding Persons(s) to be included in education: patient Barriers to Learning/Limitations:No 
 
Signed 707 KATE Cordoba RN   
11/10/2018   7:23 PM

## 2018-11-11 NOTE — ED PROVIDER NOTES
EMERGENCY DEPARTMENT HISTORY AND PHYSICAL EXAM 
 
 
Date: 11/10/2018 Patient Name: Riki Floyd History of Presenting Illness HPI: Riki Floyd is a 33 y.o. female with no sig pmhx presents to the ED for bilateral red eyes that are itchy w/ intermittent yellow d/c x 1 week. Pt denies pain, photophobia, changes in vision, nasal congestion, ear pain, wearing contacts, fever/chills. She has a 3year old at home and says she frequently changes her diaper. She denies hx of allergic conjunctivitis. PCP: None Current Outpatient Medications Medication Sig Dispense Refill  FOLIC ACID PO Take  by mouth.  ergocalciferol (VITAMIN D2) 50,000 unit capsule Take 50,000 Units by mouth.  predniSONE (DELTASONE) 10 mg tablet Take  by mouth daily (with breakfast).  trimethoprim-polymyxin b (POLYTRIM) ophthalmic solution Administer 1 Drop to both eyes every four (4) hours. 10 mL 0  
 naproxen (NAPROSYN) 500 mg tablet Take 1 Tab by mouth two (2) times daily (with meals). 20 Tab 0  
 acetaminophen (TYLENOL EXTRA STRENGTH) 500 mg tablet Take 2 Tabs by mouth every six (6) hours as needed for Pain. 20 Tab 0  
 albuterol (PROVENTIL HFA, VENTOLIN HFA, PROAIR HFA) 90 mcg/actuation inhaler Take 2 Puffs by inhalation every four (4) hours as needed for Wheezing. 1 Inhaler 1  
 azaTHIOprine (IMURAN) 50 mg tablet Take 50 mg by mouth daily.  raNITIdine (ZANTAC) 75 mg tablet Take 1 Tab by mouth two (2) times a day. 14 Tab 0 Past History Past Medical History: 
Past Medical History:  
Diagnosis Date  Ill-defined condition   
 sarcadosis  Sarcoidosis of lung ([facility] [address] diagnosed 2005 Past Surgical History: 
History reviewed. No pertinent surgical history. Family History: 
History reviewed. No pertinent family history. Social History: 
Social History Tobacco Use  Smoking status: Never Smoker  Smokeless tobacco: Never Used Substance Use Topics  Alcohol use:  No  
  Drug use: No  
 
 
Allergies: 
No Known Allergies Review of Systems Review of Systems Constitutional: Negative for chills, fever and unexpected weight change. HENT: Negative for congestion and ear pain. Eyes: Positive for discharge, redness and itching. Negative for photophobia, pain and visual disturbance. Respiratory: Negative for shortness of breath. Cardiovascular: Negative for chest pain and palpitations. Gastrointestinal: Negative for nausea and vomiting. Musculoskeletal: Negative for arthralgias and myalgias. Skin: Negative for rash. Neurological: Negative for light-headedness and headaches. All other systems reviewed and are negative. Physical Exam  
 
Vitals:  
 11/10/18 1831 11/10/18 1930 BP: (!) 153/117 (!) 133/95 Pulse: 81 Resp: 16 Temp: 98.3 °F (36.8 °C) SpO2: 95% Weight: 94.3 kg (208 lb) Height: 5' 5\" (1.651 m) Physical Exam  
Constitutional: She is oriented to person, place, and time. She appears well-developed and well-nourished. No distress. HENT:  
Head: Normocephalic and atraumatic. Eyes: EOM are normal. Pupils are equal, round, and reactive to light. Right eye exhibits discharge. Left eye exhibits discharge. Right conjunctiva is injected. Right conjunctiva has no hemorrhage. Left conjunctiva is injected. Left conjunctiva has no hemorrhage. No scleral icterus. Cardiovascular: Normal rate, regular rhythm and normal heart sounds. Pulmonary/Chest: Effort normal and breath sounds normal.  
Neurological: She is alert and oriented to person, place, and time. Skin: Skin is warm and dry. She is not diaphoretic. Psychiatric: She has a normal mood and affect. Her behavior is normal. Judgment and thought content normal.  
 
 
 
Diagnostic Study Results Labs - No results found for this or any previous visit (from the past 12 hour(s)). Radiologic Studies - No orders to display CT Results  (Last 48 hours) None CXR Results  (Last 48 hours) None Medical Decision Making I am the first provider for this patient. I reviewed the vital signs, available nursing notes, past medical history, past surgical history, family history and social history. Vital Signs-Reviewed the patient's vital signs. Records Reviewed: Nursing Notes and Old Medical Records ED Course:  
Initial assessment performed. The patients presenting problems have been discussed, and they are in agreement with the care plan formulated and outlined with them. I have encouraged them to ask questions as they arise throughout their visit. Available labs, imaging, and vital signs reviewed and read in full detail Vitals:  
 11/10/18 1831 11/10/18 1930 BP: (!) 153/117 (!) 133/95 BP 1 Location: Left arm Right arm BP Patient Position: Sitting Sitting Pulse: 81 Resp: 16 Temp: 98.3 °F (36.8 °C) SpO2: 95% Weight: 94.3 kg (208 lb) Height: 5' 5\" (1.651 m) On re evaluation pt is resting comfortably and is requesting discharge. Disposition: D/c home DISCHARGE NOTE: The patient has been re-evaluated and is ready for discharge. Patient has no new complaints, changes, or physical findings. I Counseled the patient on diagnosis and care plan. All available lab and imaging results have been reviewed by me and were discussed with the patient, including all incidental findings. The likelihood of other entities in the differential is insufficient to justify any further testing for them. This was explained to the patient. Patient agrees with plan and agrees to follow up with pcp as recommended, or return to the ED if their symptoms worsen. All medications were reviewed with the patient; will d/c home with polytrim. All of pt's questions and concerns were addressed. The patient was advised that new or worsening symptoms would require further evaluation and should prompt immediate return to the Emergency Department. Discharge instructions have been provided and explained to the patient, along with reasons to return to the ED. Patient voices understanding and is agreeable with the plan for discharge. Patient is ready to go home. Follow-up Information Follow up With Specialties Details Why Contact Info [facility]  Schedule an appointment as soon as possible for a visit in 3 days for conjunctivitis and hypertension [address] [facility] [address], [address] [place] [address] 
[phone] [facility] EMERGENCY DEPT Emergency Medicine Go to If symptoms worsen [address] [facility] Discharge Medication List as of 11/10/2018  7:21 PM  
  
START taking these medications Details  
trimethoprim-polymyxin b (POLYTRIM) ophthalmic solution Administer 1 Drop to both eyes every four (4) hours. , Normal, Disp-10 mL, R-0  
  
  
CONTINUE these medications which have NOT CHANGED Details FOLIC ACID PO Take  by mouth., Historical Med  
  
ergocalciferol (VITAMIN D2) 50,000 unit capsule Take 50,000 Units by mouth., Historical Med  
  
predniSONE (DELTASONE) 10 mg tablet Take  by mouth daily (with breakfast). , Historical Med  
  
naproxen (NAPROSYN) 500 mg tablet Take 1 Tab by mouth two (2) times daily (with meals). , Normal, Disp-20 Tab, R-0  
  
acetaminophen (TYLENOL EXTRA STRENGTH) 500 mg tablet Take 2 Tabs by mouth every six (6) hours as needed for Pain., Normal, Disp-20 Tab, R-0  
  
albuterol (PROVENTIL HFA, VENTOLIN HFA, PROAIR HFA) 90 mcg/actuation inhaler Take 2 Puffs by inhalation every four (4) hours as needed for Wheezing., Print, Disp-1 Inhaler, R-1  
  
azaTHIOprine (IMURAN) 50 mg tablet Take 50 mg by mouth daily. , Historical Med  
  
raNITIdine (ZANTAC) 75 mg tablet Take 1 Tab by mouth two (2) times a day., Print, Disp-14 Tab, R-0 Provider Notes (Medical Decision Making):  
 Pt likely has bacterial conjunctivitis from fecal matter after changing her leobardo diaper. Pt has no photophobia, changes in vision, or pain to warrant further exam 
 
DDx: foreign body, corneal abrasion, allergic vs bacterial conjunctivitis, contact lens overuse, low concern for uveitis, acute glaucoma, keratitis Some of these diagnoses need further lab and imaging that is not available in the ER or is not indicated at this time. Likely pt has bacterial conjunctivitis given hx, physical and workup today. Procedures: 
Procedures Core Measures: 
 
Critical Care Time:   
 
 
Diagnosis Clinical Impression: 1. Acute bacterial conjunctivitis of both eyes 2. Hypertension, unspecified type This note will not be viewable in [address]

## 2019-11-09 ENCOUNTER — HOSPITAL ENCOUNTER (EMERGENCY)
Age: 29
Discharge: HOME HEALTH | End: 2019-11-09
Attending: EMERGENCY MEDICINE
Payer: MEDICARE

## 2019-11-09 VITALS
TEMPERATURE: 98.2 F | OXYGEN SATURATION: 97 % | RESPIRATION RATE: 19 BRPM | DIASTOLIC BLOOD PRESSURE: 98 MMHG | SYSTOLIC BLOOD PRESSURE: 155 MMHG | BODY MASS INDEX: 40.48 KG/M2 | HEART RATE: 83 BPM | WEIGHT: 243 LBS | HEIGHT: 65 IN
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
## 2019-11-09 DIAGNOSIS — S60.449A CONSTRICTIVE JEWELRY OF FINGER, INITIAL ENCOUNTER: Primary | ICD-10-CM

## 2019-11-09 PROCEDURE — 99281 EMR DPT VST MAYX REQ PHY/QHP: CPT

## 2019-11-09 PROCEDURE — 99283 EMERGENCY DEPT VISIT LOW MDM: CPT

## 2019-11-09 NOTE — ED NOTES
Pt presents to ED ambulatory complaining that her rings are too tight. She says this started over the summer and just started to bother her. Pt is alert and oriented x 4, RR even and unlabored, skin is warm and dry. Assessment completed and pt updated on plan of care. Emergency Department Nursing Plan of Care       The Nursing Plan of Care is developed from the Nursing assessment and Emergency Department Attending provider initial evaluation. The plan of care may be reviewed in the ED Provider note.     The Plan of Care was developed with the following considerations:   Patient / Family readiness to learn indicated by:verbalized understanding  Persons(s) to be included in education: patient  Barriers to Learning/Limitations:No    Signed     Ashley Lubin RN    11/9/2019   3:53 PM

## 2019-11-09 NOTE — ED NOTES
Patient and visitor trying to leave prior to being registered. Explained to them that registration is on their way and then we can provide them with discharge patients. Patient rolled her eyes and went back into room.

## 2019-11-09 NOTE — ED NOTES
Patient and visitor demanding and agitated. \"Why does it take this long to get these rings off?\"  Explained to patient and visitor we wanted to attempt to remove rings with mineral oil in order to preserve rings. Patient demands, \"just cut them off! \"

## 2019-11-09 NOTE — ED PROVIDER NOTES
EMERGENCY DEPARTMENT HISTORY AND PHYSICAL EXAM    Date: 11/9/2019  Patient Name: Mark Slade    History of Presenting Illness     Chief Complaint   Patient presents with    Finger Pain         History Provided By: Patient    Chief Complaint: finger pain  Duration: onset this summer   Timing:  Gradual and Worsening  Location: right third and 4th fngers and left third finger  Quality: Aching  Severity: 3 out of 10  Modifying Factors: none  Associated Symptoms: swelling tight rings      HPI: Mark Slade is a 26 y.o. female with a PMH of No significant past medical history who presents with onset this summer. Patient states her finger started to swell and then she had pain in her fingers. Patient states her rings became tight. Patient states she thought after summer that the swelling of her fingers and the pain would be relieved but now states she thinks she is just gained weight and needs the rings to be removed. She denies numbness or tingling of her fingers. She states she has tried to remove the rings with mineral oil without relief. PCP: None    Current Outpatient Medications   Medication Sig Dispense Refill    FOLIC ACID PO Take  by mouth.  ergocalciferol (VITAMIN D2) 50,000 unit capsule Take 50,000 Units by mouth.  predniSONE (DELTASONE) 10 mg tablet Take  by mouth daily (with breakfast).  trimethoprim-polymyxin b (POLYTRIM) ophthalmic solution Administer 1 Drop to both eyes every four (4) hours. 10 mL 0    naproxen (NAPROSYN) 500 mg tablet Take 1 Tab by mouth two (2) times daily (with meals). 20 Tab 0    acetaminophen (TYLENOL EXTRA STRENGTH) 500 mg tablet Take 2 Tabs by mouth every six (6) hours as needed for Pain. 20 Tab 0    albuterol (PROVENTIL HFA, VENTOLIN HFA, PROAIR HFA) 90 mcg/actuation inhaler Take 2 Puffs by inhalation every four (4) hours as needed for Wheezing. 1 Inhaler 1    azaTHIOprine (IMURAN) 50 mg tablet Take 50 mg by mouth daily.       raNITIdine (ZANTAC) 75 mg tablet Take 1 Tab by mouth two (2) times a day. 14 Tab 0       Past History     Past Medical History:  Past Medical History:   Diagnosis Date    Ill-defined condition     sarcadosis    Sarcoidosis of lung ([address]     diagnosed 2005       Past Surgical History:  History reviewed. No pertinent surgical history. Family History:  History reviewed. No pertinent family history. Social History:  Social History     Tobacco Use    Smoking status: Never Smoker    Smokeless tobacco: Never Used   Substance Use Topics    Alcohol use: No    Drug use: No       Allergies:  No Known Allergies      Review of Systems   Review of Systems   Constitutional: Negative for fever. Respiratory: Negative for shortness of breath. Cardiovascular: Negative for chest pain. Gastrointestinal: Negative for abdominal pain. Musculoskeletal: Negative for arthralgias (finger pain). Skin: Negative for pallor and rash. Neurological: Negative for headaches. All other systems reviewed and are negative. Physical Exam     Vitals:    11/09/19 1537   BP: (!) 155/98   Pulse: 83   Resp: 19   Temp: 98.2 °F (36.8 °C)   SpO2: 97%   Weight: 110.2 kg (243 lb)   Height: 5' 5\" (1.651 m)     Physical Exam   Constitutional: She is oriented to person, place, and time. She appears well-developed and well-nourished. No distress. HENT:   Head: Normocephalic and atraumatic. Right Ear: External ear normal.   Left Ear: External ear normal.   Nose: Nose normal.   Mouth/Throat: Oropharynx is clear and moist.   Eyes: Conjunctivae are normal.   Neck: Normal range of motion. Neck supple. Cardiovascular: Normal rate, regular rhythm and normal heart sounds. Pulmonary/Chest: Effort normal and breath sounds normal. No respiratory distress. She has no wheezes. Abdominal: Soft. Bowel sounds are normal. There is no tenderness. Musculoskeletal: Normal range of motion.         Hands:  Swelling left third finger and right 3rd and 4th fingers   Lymphadenopathy:     She has no cervical adenopathy. Neurological: She is alert and oriented to person, place, and time. No cranial nerve deficit. Coordination normal.   Skin: Skin is warm and dry. No rash noted. Psychiatric: She has a normal mood and affect. Her behavior is normal. Judgment and thought content normal.   Nursing note and vitals reviewed. Diagnostic Study Results     Labs -   No results found for this or any previous visit (from the past 12 hour(s)). Radiologic Studies -   No orders to display     CT Results  (Last 48 hours)    None        CXR Results  (Last 48 hours)    None            Medical Decision Making   I am the first provider for this patient. I reviewed the vital signs, available nursing notes, past medical history, past surgical history, family history and social history. Vital Signs-Reviewed the patient's vital signs. Records Reviewed: Nursing Notes     Patient declined use of mineral oil or surgery lube to remove rings. States I want them cut off. Disposition:  home    DISCHARGE NOTE:         Care plan outlined and precautions discussed. Patient has no new complaints, changes, or physical findings. . All medications were reviewed with the patient; will d/c home with . All of pt's questions and concerns were addressed. Patient was instructed and agrees to follow up with PCP, as well as to return to the ED upon further deterioration. Patient is ready to go home. Follow-up Information     Follow up With Specialties Details Why [address] Internal Medicine In 1 week  [address]  [phone]          Current Discharge Medication List          Provider Notes (Medical Decision Making):   DDX total finger sprain constriction of finger due to ring  Procedures:   Other Procedure  Date/Time: 11/9/2019 4:56 PM  Performed by: Delicia Harley NP  Authorized by: Lisa Huynh CYRUS NP     Consent:     Consent obtained:  Verbal    Consent given by:  Patient    Risks discussed:  Pain    Alternatives discussed: use of surgilube or mineral oil. Indications:     Indications:  Removal of thre rings  Pre-procedure details:     Procedure prep: n/a. Post-procedure details:     Patient tolerance of procedure: Tolerated well, no immediate complications  Comments:      3 rings removed with electronic device on ring cutter        Please note that this dictation was completed with Dragon, computer voice recognition software. Quite often unanticipated grammatical, syntax, homophones, and other interpretive errors are inadvertently transcribed by the computer software. Please disregard these errors. Additionally, please excuse any errors that have escaped final proofreading. Diagnosis     Clinical Impression:   1.  Constrictive jewelry of finger, initial encounter

## 2019-11-09 NOTE — DISCHARGE INSTRUCTIONS
Finger Patient Education        Strain or Sprain: Care Instructions  Your Care Instructions    A strain happens when you overstretch, or pull, a muscle. A sprain occurs when you stretch or tear a ligament, the tough tissue that connects one bone to another. These problems can happen when you exercise or lift something or when you are in an accident. Rest and other home care can help strains and sprains heal.  The doctor has checked you carefully, but problems can develop later. If you notice any problems or new symptoms,  get medical treatment right away. Follow-up care is a key part of your treatment and safety. Be sure to make and go to all appointments, and call your doctor if you are having problems. It's also a good idea to know your test results and keep a list of the medicines you take. How can you care for yourself at home? · If your doctor gave you a sling, splint, brace, or immobilizer, use it exactly as directed. · Rest the strained or sprained area, and follow your doctor's advice about when you can be active again. · Put ice or a cold pack on the sore area for 10 to 20 minutes at a time to stop swelling. Try this every 1 to 2 hours for 3 days (when you are awake) or until the swelling goes down. Put a thin cloth between the ice pack and your skin. Keep your splint or brace dry. · Prop up a sore arm or leg on a pillow when you ice it or anytime you sit or lie down. Try to keep it higher than the level of your heart. This will help reduce swelling. · Take pain medicines exactly as directed. ? If the doctor gave you a prescription medicine for pain, take it as prescribed. ? If you are not taking a prescription pain medicine, ask your doctor if you can take an over-the-counter medicine. · Do exercises as directed by your doctor or physical therapist.  · Return to your usual level of activity slowly. · Do not do anything that makes the pain worse. When should you call for help?   Call your doctor now or seek immediate medical care if:    · You have severe or increasing pain.     · You have tingling, weakness, or numbness in the area.     · The area turns cold or changes color.     · Your cast or splint feels too tight.     · You have symptoms of a blood clot, such as:  ? Pain in your calf, back of the knee, thigh, or groin. ? Redness and swelling in your leg or groin.     · You cannot move the strained part of your body.    Watch closely for changes in your health, and be sure to contact your doctor if:    · You do not get better as expected. Where can you learn more? Go to http://brown-katie.info/. Enter X601 in the search box to learn more about \"Strain or Sprain: Care Instructions. \"  Current as of: June 26, 2019  Content Version: 12.2  © 4289-6980 [employer]. Care instructions adapted under license by [employer] (which disclaims liability or warranty for this information). If you have questions about a medical condition or this instruction, always ask your healthcare professional. [address] any warranty or liability for your use of this information.

## 2021-03-15 ENCOUNTER — HOSPITAL ENCOUNTER (EMERGENCY)
Age: 31
Discharge: HOME | End: 2021-03-15
Attending: EMERGENCY MEDICINE
Payer: MEDICARE

## 2021-03-15 VITALS
SYSTOLIC BLOOD PRESSURE: 152 MMHG | WEIGHT: 230 LBS | TEMPERATURE: 98.4 F | DIASTOLIC BLOOD PRESSURE: 92 MMHG | BODY MASS INDEX: 38.32 KG/M2 | HEIGHT: 65 IN | HEART RATE: 99 BPM | RESPIRATION RATE: 16 BRPM | OXYGEN SATURATION: 93 %

## 2021-03-15 DIAGNOSIS — K02.9 DENTAL CARIES: Primary | ICD-10-CM

## 2021-03-15 DIAGNOSIS — K08.89 DENTALGIA: ICD-10-CM

## 2021-03-15 PROCEDURE — 99283 EMERGENCY DEPT VISIT LOW MDM: CPT

## 2021-03-15 PROCEDURE — 74011250637 HC RX REV CODE- 250/637: Performed by: PHYSICIAN ASSISTANT

## 2021-03-15 PROCEDURE — 74011000250 HC RX REV CODE- 250: Performed by: PHYSICIAN ASSISTANT

## 2021-03-15 RX ORDER — PENICILLIN V POTASSIUM 500 MG/1
500 TABLET, FILM COATED ORAL 4 TIMES DAILY
Qty: 28 TAB | Refills: 0 | Status: SHIPPED | OUTPATIENT
Start: 2021-03-15 | End: 2021-03-22

## 2021-03-15 RX ADMIN — DIPHENHYDRAMINE HYDROCHLORIDE: 12.5 LIQUID ORAL at 14:15

## 2021-03-15 NOTE — ED PROVIDER NOTES
EMERGENCY DEPARTMENT HISTORY AND PHYSICAL EXAM    Date: 3/15/2021  Patient Name: Kaitlynn Downing    History of Presenting Illness     Chief Complaint   Patient presents with    Dental Pain         History Provided By: Patient    HPI: Kaitlynn Downing is a 31 y.o. female with a PMH of sarcoidosis who presents with L upper dental pain since the weekend but worsened this morning. Pt states she woke up with her face slightly swollen. Pt rates pain 5/10. Pt states she has been taking tylenol since that all she can take because she is 20wks pregnant. PCP: None    Current Outpatient Medications   Medication Sig Dispense Refill    penicillin v potassium (VEETID) 500 mg tablet Take 1 Tab by mouth four (4) times daily for 7 days. 28 Tab 0    acetaminophen (TYLENOL EXTRA STRENGTH) 500 mg tablet Take 2 Tabs by mouth every six (6) hours as needed for Pain. 20 Tab 0       Past History     Past Medical History:  Past Medical History:   Diagnosis Date    Ill-defined condition     sarcadosis    Sarcoidosis of lung ([facility] [address]     diagnosed 2005       Past Surgical History:  No past surgical history on file. Family History:  History reviewed. No pertinent family history. Social History:  Social History     Tobacco Use    Smoking status: Never Smoker    Smokeless tobacco: Never Used   Substance Use Topics    Alcohol use: No    Drug use: No       Allergies:  No Known Allergies      Review of Systems   Review of Systems   Constitutional: Negative for chills and fever. HENT: Positive for dental problem and facial swelling. Neurological: Negative for speech difficulty and weakness. All other systems reviewed and are negative. Physical Exam     Vitals:    03/15/21 1347   BP: (!) 152/92   Pulse: 99   Resp: 16   Temp: 98.4 °F (36.9 °C)   SpO2: 93%   Weight: 104.3 kg (230 lb)   Height: 5' 5\" (1.651 m)     Physical Exam  Vitals signs and nursing note reviewed.    Constitutional:       General: She is not in acute distress. Appearance: She is well-developed. HENT:      Head: Normocephalic and atraumatic. Mouth/Throat:      Dentition: Abnormal dentition (several fractured teeth, minimal erythema of gums noted around tooth #12). Dental tenderness and dental caries present. Eyes:      Conjunctiva/sclera: Conjunctivae normal.   Cardiovascular:      Rate and Rhythm: Normal rate and regular rhythm. Heart sounds: Normal heart sounds. Pulmonary:      Effort: Pulmonary effort is normal. No respiratory distress. Breath sounds: Normal breath sounds. No wheezing or rales. Abdominal:      General: Distension: +gravid abdomen. Skin:     General: Skin is warm and dry. Neurological:      Mental Status: She is alert and oriented to person, place, and time. Psychiatric:         Behavior: Behavior normal.         Thought Content: Thought content normal.         Judgment: Judgment normal.           Diagnostic Study Results     Labs -   No results found for this or any previous visit (from the past 12 hour(s)). Radiologic Studies -   No orders to display     CT Results  (Last 48 hours)    None        CXR Results  (Last 48 hours)    None            Medical Decision Making   I am the first provider for this patient. I reviewed the vital signs, available nursing notes, past medical history, past surgical history, family history and social history. Vital Signs-Reviewed the patient's vital signs. Records Reviewed: Nursing Notes and Old Medical Records    Provider Notes (Medical Decision Making):   Patient presents with dental pain. No obvious abscess that needs drainage. No red flags that make PTA, RPA, ludwigs angina concerning. Will tx with dental ball, antibiotics and outpatient analgesics. Disposition:  Discharged    DISCHARGE NOTE:   2:25 PM      Care plan outlined and precautions discussed. Patient has no new complaints, changes, or physical findings.   All medications were reviewed with the patient; will d/c home. All of pt's questions and concerns were addressed. Patient was instructed and agrees to follow up with dentist, as well as to return to the ED upon further deterioration. Patient is ready to go home. Follow-up Information     Follow up With Specialties Details Why Contact Info    Your dentist   as scheduled           Discharge Medication List as of 3/15/2021  2:38 PM      START taking these medications    Details   penicillin v potassium (VEETID) 500 mg tablet Take 1 Tab by mouth four (4) times daily for 7 days. , Normal, Disp-28 Tab, R-0         CONTINUE these medications which have NOT CHANGED    Details   acetaminophen (TYLENOL EXTRA STRENGTH) 500 mg tablet Take 2 Tabs by mouth every six (6) hours as needed for Pain., Normal, Disp-20 Tab, R-0         STOP taking these medications       predniSONE (DELTASONE) 10 mg tablet Comments:   Reason for Stopping:         trimethoprim-polymyxin b (POLYTRIM) ophthalmic solution Comments:   Reason for Stopping:         naproxen (NAPROSYN) 500 mg tablet Comments:   Reason for Stopping:               Procedures:  Procedures    Please note that this dictation was completed with Dragon, computer voice recognition software. Quite often unanticipated grammatical, syntax, homophones, and other interpretive errors are inadvertently transcribed by the computer software. Please disregard these errors. Additionally, please excuse any errors that have escaped final proofreading. Diagnosis     Clinical Impression:   1. Dental caries    2.  Annalise Russell

## 2021-03-15 NOTE — ED NOTES
Pt given printed discharge instructions and 1 script(s). Pt verbalized understanding of instructions and script(s). Pt verbalized importance of following up with dentist.  Pt alert and oriented, in no acute distress, ambulatory with self. Emergency Department Nursing Plan of Care       The Nursing Plan of Care is developed from the Nursing assessment and Emergency Department Attending provider initial evaluation. The plan of care may be reviewed in the ED Provider note.     The Plan of Care was developed with the following considerations:   Patient / Family readiness to learn indicated by:verbalized understanding  Persons(s) to be included in education: patient  Barriers to Learning/Limitations:No    Signed     Penelope Eric RN    3/15/2021   2:45 PM

## 2021-12-16 ENCOUNTER — HOSPITAL ENCOUNTER (EMERGENCY)
Age: 31
Discharge: HOME | End: 2021-12-16
Attending: EMERGENCY MEDICINE | Admitting: EMERGENCY MEDICINE
Payer: MEDICARE

## 2021-12-16 VITALS
BODY MASS INDEX: 38.32 KG/M2 | OXYGEN SATURATION: 95 % | HEART RATE: 103 BPM | WEIGHT: 230 LBS | DIASTOLIC BLOOD PRESSURE: 93 MMHG | RESPIRATION RATE: 20 BRPM | SYSTOLIC BLOOD PRESSURE: 123 MMHG | HEIGHT: 65 IN | TEMPERATURE: 98.1 F

## 2021-12-16 DIAGNOSIS — K04.7 DENTAL ABSCESS: Primary | ICD-10-CM

## 2021-12-16 PROCEDURE — 99283 EMERGENCY DEPT VISIT LOW MDM: CPT

## 2021-12-16 PROCEDURE — 74011000250 HC RX REV CODE- 250: Performed by: PHYSICIAN ASSISTANT

## 2021-12-16 PROCEDURE — 74011250637 HC RX REV CODE- 250/637: Performed by: PHYSICIAN ASSISTANT

## 2021-12-16 RX ORDER — PENICILLIN V POTASSIUM 500 MG/1
500 TABLET, FILM COATED ORAL 4 TIMES DAILY
Qty: 28 TABLET | Refills: 0 | Status: SHIPPED | OUTPATIENT
Start: 2021-12-16 | End: 2021-12-23

## 2021-12-16 RX ORDER — IBUPROFEN 800 MG/1
800 TABLET ORAL
Qty: 20 TABLET | Refills: 0 | Status: SHIPPED | OUTPATIENT
Start: 2021-12-16 | End: 2021-12-23

## 2021-12-16 RX ADMIN — LIDOCAINE HYDROCHLORIDE: 20 SOLUTION TOPICAL at 13:21

## 2021-12-16 NOTE — DISCHARGE INSTRUCTIONS
You can also follow up with [facility] [address] [place] [address],[address]  [address] [place] [address] [address]  [address] [place]

## 2021-12-16 NOTE — ED NOTES
Emergency Department Nursing Plan of Care       The Nursing Plan of Care is developed from the Nursing assessment and Emergency Department Attending provider initial evaluation. The plan of care may be reviewed in the ED Provider note.     The Plan of Care was developed with the following considerations:   Patient / Family readiness to learn indicated by:verbalized understanding  Persons(s) to be included in education: patient  Barriers to Learning/Limitations:No    [id number] [facility] RODOLFO Cramer    12/16/2021   1:26 PM

## 2021-12-16 NOTE — ED PROVIDER NOTES
EMERGENCY DEPARTMENT HISTORY AND PHYSICAL EXAM    Date: 12/16/2021  Patient Name: Anthony Acosta    History of Presenting Illness     Chief Complaint   Patient presents with    Dental Pain         History Provided By: Patient    HPI: Anthony Acosta is a 27 y.o. female with a PMH of sarcoidosis who presents with left upper dental pain and swelling x3 days. Patient states pain worsened yesterday. Patient rates pain 3 out of 10 and throbbing in nature. There are no alleviating or exacerbating factors reported. PCP: None    Current Facility-Administered Medications   Medication Dose Route Frequency Provider Last Rate Last Admin    dental ball (lidocaine/Benadryl/Cetacaine) mixture   Mucous Membrane ONCE Edelmira Rosado PA-C         Current Outpatient Medications   Medication Sig Dispense Refill    penicillin v potassium (VEETID) 500 mg tablet Take 1 Tablet by mouth four (4) times daily for 7 days. 28 Tablet 0    ibuprofen (MOTRIN) 800 mg tablet Take 1 Tablet by mouth every eight (8) hours as needed for Pain for up to 7 days. 20 Tablet 0    acetaminophen (TYLENOL EXTRA STRENGTH) 500 mg tablet Take 2 Tabs by mouth every six (6) hours as needed for Pain. 20 Tab 0       Past History     Past Medical History:  Past Medical History:   Diagnosis Date    Ill-defined condition     sarcadosis    Sarcoidosis of lung ([facility] [address]     diagnosed 2005       Past Surgical History:  History reviewed. No pertinent surgical history. Family History:  History reviewed. No pertinent family history. Social History:  Social History     Tobacco Use    Smoking status: Never Smoker    Smokeless tobacco: Never Used   Substance Use Topics    Alcohol use: No    Drug use: No       Allergies:  No Known Allergies      Review of Systems   Review of Systems   Constitutional: Negative for fever. HENT: Positive for dental problem and facial swelling. Allergic/Immunologic: Negative for immunocompromised state.    Neurological: Negative for speech difficulty and weakness. All other systems reviewed and are negative. Physical Exam     Vitals:    12/16/21 1250   BP: (!) 123/93   Pulse: (!) 103   Resp: 20   Temp: 98.1 °F (36.7 °C)   SpO2: 95%   Weight: 104.3 kg (230 lb)   Height: 5' 5\" (1.651 m)     Physical Exam  Vitals and nursing note reviewed. Constitutional:       General: She is not in acute distress. Appearance: She is well-developed. HENT:      Head: Normocephalic and atraumatic. Right Ear: Tympanic membrane normal.      Left Ear: Tympanic membrane normal.      Mouth/Throat:      Mouth: Mucous membranes are moist.      Dentition: Abnormal dentition (poor dentition throughout). Dental caries present. Pharynx: Oropharynx is clear. Uvula midline. Eyes:      Conjunctiva/sclera: Conjunctivae normal.   Cardiovascular:      Rate and Rhythm: Normal rate and regular rhythm. Heart sounds: Normal heart sounds. Pulmonary:      Effort: Pulmonary effort is normal. No respiratory distress. Breath sounds: Normal breath sounds. No wheezing or rales. Skin:     General: Skin is warm and dry. Neurological:      Mental Status: She is alert and oriented to person, place, and time. Psychiatric:         Behavior: Behavior normal.         Thought Content: Thought content normal.         Judgment: Judgment normal.           Diagnostic Study Results     Labs -   No results found for this or any previous visit (from the past 12 hour(s)). Radiologic Studies -   No orders to display     CT Results  (Last 48 hours)    None        CXR Results  (Last 48 hours)    None            Medical Decision Making   I am the first provider for this patient. I reviewed the vital signs, available nursing notes, past medical history, past surgical history, family history and social history. Vital Signs-Reviewed the patient's vital signs.     Records Reviewed: Nursing Notes and Old Medical Records    Provider Notes (Medical Decision Making):   Patient presents with dental pain. No obvious abscess that needs drainage. No red flags that make PTA, RPA, ludwigs angina concerning. Will tx with dental ball, antibiotics and outpatient analgesics. Given information on dentists and importance of followup     Disposition:  Discharged    DISCHARGE NOTE:   1:13 PM        Care plan outlined and precautions discussed. Patient has no new complaints, changes, or physical findings. All medications were reviewed with the patient; will d/c home. All of pt's questions and concerns were addressed. Patient was instructed and agrees to follow up with dentist, as well as to return to the ED upon further deterioration. Patient is ready to go home. Follow-up Information     Follow up With Specialties Details Why Contact Info    [facility]    [address]          Current Discharge Medication List      START taking these medications    Details   penicillin v potassium (VEETID) 500 mg tablet Take 1 Tablet by mouth four (4) times daily for 7 days. Qty: 28 Tablet, Refills: 0  Start date: 12/16/2021, End date: 12/23/2021      ibuprofen (MOTRIN) 800 mg tablet Take 1 Tablet by mouth every eight (8) hours as needed for Pain for up to 7 days. Qty: 20 Tablet, Refills: 0  Start date: 12/16/2021, End date: 12/23/2021             Procedures:  Procedures    Please note that this dictation was completed with Dragon, computer voice recognition software. Quite often unanticipated grammatical, syntax, homophones, and other interpretive errors are inadvertently transcribed by the computer software. Please disregard these errors. Additionally, please excuse any errors that have escaped final proofreading. Diagnosis     Clinical Impression:   1.  Dental abscess

## 2022-01-01 ENCOUNTER — HOSPITAL ENCOUNTER (EMERGENCY)
Age: 32
Discharge: HOME | End: 2022-01-01
Attending: EMERGENCY MEDICINE
Payer: MEDICARE

## 2022-01-01 VITALS
OXYGEN SATURATION: 94 % | WEIGHT: 206 LBS | HEART RATE: 114 BPM | HEIGHT: 65 IN | BODY MASS INDEX: 34.32 KG/M2 | RESPIRATION RATE: 16 BRPM | DIASTOLIC BLOOD PRESSURE: 90 MMHG | TEMPERATURE: 100 F | SYSTOLIC BLOOD PRESSURE: 134 MMHG

## 2022-01-01 DIAGNOSIS — J06.9 VIRAL UPPER RESPIRATORY TRACT INFECTION: ICD-10-CM

## 2022-01-01 DIAGNOSIS — Z20.822 SUSPECTED COVID-19 VIRUS INFECTION: Primary | ICD-10-CM

## 2022-01-01 PROCEDURE — 99282 EMERGENCY DEPT VISIT SF MDM: CPT

## 2022-01-01 RX ORDER — ACETAMINOPHEN 500 MG
1000 TABLET ORAL
Qty: 20 TABLET | Refills: 0 | Status: SHIPPED | OUTPATIENT
Start: 2022-01-01

## 2022-01-01 RX ORDER — BENZONATATE 100 MG/1
100 CAPSULE ORAL
Qty: 30 CAPSULE | Refills: 0 | Status: SHIPPED | OUTPATIENT
Start: 2022-01-01 | End: 2022-01-08

## 2022-01-01 NOTE — Clinical Note
[facility] EMERGENCY DEPT  [address] [facility] [address] [phone]    Work/School Note    Date: 1/1/2022     To Whom It May concern:    Lola Bledsoe was evaluated by the following provider(s):  Attending Provider: Chelita Fowler DO  Physician Assistant: Annamarie Pierce virus is suspected. Per the CDC guidelines we recommend home isolation until the following conditions are all met:    1. At least five days have passed since symptoms first appeared and/or had a close exposure,   2. After home isolation for five days, wearing a mask around others for the next five days,  3. At least 24 have passed since last fever without the use of fever-reducing medications and  4.  Symptoms (eg cough, shortness of breath) have improved      Sincerely,          Antoinette Cunha PA-C

## 2022-01-01 NOTE — DISCHARGE INSTRUCTIONS
It was a pleasure taking care of you at [facility] Emergency Department today. We know that when you come to [facility], you are entrusting us with your health, comfort, and safety. Our physicians and nurses honor that trust, and we truly appreciate the opportunity to care for you and your loved ones. We also value our feedback. If you receive a survey about your Emergency Department experience today, please fill it out. We care about our patients' feedback, and we listen to what you have to say. Thank you!

## 2022-01-01 NOTE — ED NOTES
Pt in with low grade fever, cough x 2 days, unvaccinated against COVID and with positive exposure 3 days ago. Emergency Department Nursing Plan of Care       The Nursing Plan of Care is developed from the Nursing assessment and Emergency Department Attending provider initial evaluation. The plan of care may be reviewed in the ED Provider note.     The Plan of Care was developed with the following considerations:   Patient / Family readiness to learn indicated by:verbalized understanding  Persons(s) to be included in education: patient  Barriers to Learning/Limitations:No    Signed     Leana Velazquez RN    1/1/2022   2:23 PM

## 2022-01-01 NOTE — ED PROVIDER NOTES
EMERGENCY DEPARTMENT HISTORY AND PHYSICAL EXAM      Date: 1/1/2022  Patient Name: Dayan Espinoza    History of Presenting Illness     Chief Complaint   Patient presents with    Concern For COVID-19 (Coronavirus)     fever and body aches     History Provided By: Patient    HPI: Dayan Espinoza, 27 y.o. female with medical history significant for sarcoidosis who presents via self to the ED with cc of acute moderate aching generalized body aches, chills, fever, nonproductive cough X 2 days. No exposure to COVID-19 this past Wednesday. No chest pain, shortness of breath, wheezing, lightheadedness, dizziness, syncope, seizure, abdominal pain, nausea, vomiting. No medications or modifying factors prior to arrival today. PCP: None    There are no other complaints, changes, or physical findings at this time. No current facility-administered medications on file prior to encounter. Current Outpatient Medications on File Prior to Encounter   Medication Sig Dispense Refill    [DISCONTINUED] acetaminophen (TYLENOL EXTRA STRENGTH) 500 mg tablet Take 2 Tabs by mouth every six (6) hours as needed for Pain. 20 Tab 0     Past History     Past Medical History:  Past Medical History:   Diagnosis Date    Ill-defined condition     sarcadosis    Sarcoidosis of lung ([facility] [address]     diagnosed 2005     Past Surgical History:  No past surgical history on file. Family History:  History reviewed. No pertinent family history. Social History:  Social History     Tobacco Use    Smoking status: Never Smoker    Smokeless tobacco: Never Used   Substance Use Topics    Alcohol use: No    Drug use: No     Allergies:  No Known Allergies  Review of Systems   Review of Systems   Constitutional: Positive for activity change, chills, fatigue and fever. Negative for appetite change, diaphoresis and unexpected weight change. HENT: Positive for congestion.  Negative for drooling, ear discharge, ear pain, facial swelling, postnasal drip, rhinorrhea, sinus pressure, sinus pain, sneezing, sore throat, trouble swallowing and voice change. Eyes: Negative for photophobia, pain, discharge and visual disturbance. Respiratory: Positive for cough. Negative for chest tightness, shortness of breath, wheezing and stridor. Cardiovascular: Negative for chest pain. Gastrointestinal: Negative for abdominal pain, constipation, diarrhea, nausea and vomiting. Genitourinary: Negative. Negative for dysuria, flank pain, hematuria and urgency. Musculoskeletal: Positive for myalgias. Negative for arthralgias, back pain, gait problem, joint swelling, neck pain and neck stiffness. Skin: Negative. Negative for rash. Neurological: Positive for headaches. Negative for dizziness, seizures, syncope, weakness, light-headedness and numbness. Psychiatric/Behavioral: Negative. Physical Exam   Physical Exam  Vitals and nursing note reviewed. Constitutional:       General: She is not in acute distress. Appearance: She is well-developed. She is obese. She is not ill-appearing, toxic-appearing or diaphoretic. HENT:      Head: Normocephalic and atraumatic. Right Ear: Hearing, tympanic membrane, ear canal and external ear normal.      Left Ear: Hearing, tympanic membrane, ear canal and external ear normal.      Nose: Mucosal edema and congestion present. No rhinorrhea. Right Sinus: No maxillary sinus tenderness or frontal sinus tenderness. Left Sinus: No maxillary sinus tenderness or frontal sinus tenderness. Mouth/Throat:      Mouth: Mucous membranes are moist.      Pharynx: Uvula midline. No oropharyngeal exudate or posterior oropharyngeal erythema. Tonsils: No tonsillar abscesses. Eyes:      Conjunctiva/sclera: Conjunctivae normal.      Pupils: Pupils are equal, round, and reactive to light. Cardiovascular:      Rate and Rhythm: Regular rhythm. Tachycardia present. Heart sounds: Normal heart sounds.    Pulmonary: Effort: Pulmonary effort is normal. No tachypnea, accessory muscle usage or respiratory distress. Breath sounds: Normal breath sounds and air entry. No stridor. No decreased breath sounds, wheezing, rhonchi or rales. Abdominal:      General: Bowel sounds are normal. There is no distension. Palpations: Abdomen is soft. Abdomen is not rigid. Tenderness: There is no abdominal tenderness. There is no right CVA tenderness, left CVA tenderness, guarding or rebound. Negative signs include Infante's sign and McBurney's sign. Musculoskeletal:         General: Normal range of motion. Cervical back: Normal range of motion. Skin:     General: Skin is warm and dry. Coloration: Skin is not pale. Neurological:      Mental Status: She is alert and oriented to person, place, and time. She is not disoriented. GCS: GCS eye subscore is 4. GCS verbal subscore is 5. GCS motor subscore is 6. Cranial Nerves: No cranial nerve deficit. Sensory: No sensory deficit. Motor: No tremor or seizure activity. Psychiatric:         Speech: Speech normal.         Behavior: Behavior normal.         Thought Content: Thought content normal.         Judgment: Judgment normal.       Diagnostic Study Results   Labs -   No results found for this or any previous visit (from the past 12 hour(s)). Radiologic Studies -   No orders to display     No results found. Medical Decision Making   I am the first provider for this patient. I reviewed the vital signs, available nursing notes, past medical history, past surgical history, family history and social history. Vital Signs-Reviewed the patient's vital signs.   Patient Vitals for the past 24 hrs:   Temp Pulse Resp BP SpO2   01/01/22 1415 100 °F (37.8 °C) (!) 114 16 (!) 134/90 94 %     Pulse Oximetry Analysis - 94% on RA (normal)    Records Reviewed: Nursing Notes, Old Medical Records, Previous Radiology Studies and Previous Laboratory Studies    Provider Notes (Medical Decision Making):   Patient presents with covid/flu-like symptoms including upper respiratory symptoms, myalgias, fever. DDx: COVID 19, Influenza, URI, PNA, bronchitis. Bilateral lung sounds clear to auscultation. No indication for further labs or imaging at this time. ED Course:   Initial assessment performed. The patients presenting problems have been discussed, and they are in agreement with the care plan formulated and outlined with them. I have encouraged them to ask questions as they arise throughout their visit. Progress Note:   Updated pt on all returned results and findings. Discussed the importance of proper follow up as referred below along with return precautions. Pt in agreement with the care plan and expresses agreement with and understanding of all items discussed. Disposition:  2:31 PM  I have discussed with patient their diagnosis, treatment, and follow up plan. The patient agrees to follow up as outlined in discharge paperwork and also to return to the ED with any worsening. Blade Chavis PA-C      PLAN:  1. Current Discharge Medication List      START taking these medications    Details   benzonatate (Tessalon Perles) 100 mg capsule Take 1 Capsule by mouth three (3) times daily as needed for Cough for up to 7 days. Qty: 30 Capsule, Refills: 0  Start date: 1/1/2022, End date: 1/8/2022      acetaminophen (TYLENOL) 500 mg tablet Take 2 Tablets by mouth every six (6) hours as needed for Pain. Qty: 20 Tablet, Refills: 0  Start date: 1/1/2022           2.    Follow-up Information     Follow up With Specialties Details Why Elvia Osuna  Schedule an appointment as soon as possible for a visit in 1 week As needed [facility]  [phone]    [facility] EMERGENCY DEPT Emergency Medicine Go to  As needed, If symptoms worsen [address] [facility]  [phone]        Return to ED if worse     Diagnosis Clinical Impression:   1. Suspected COVID-19 virus infection    2. Viral upper respiratory tract infection            Please note that this dictation was completed with Dragon, computer voice recognition software. Quite often unanticipated grammatical, syntax, homophones, and other interpretive errors are inadvertently transcribed by the computer software. Please disregard these errors. Additionally, please excuse any errors that have escaped final proofreading.

## 2023-05-11 RX ORDER — ACETAMINOPHEN 500 MG
TABLET ORAL EVERY 6 HOURS PRN
COMMUNITY
Start: 2022-01-01

## 2024-03-20 ENCOUNTER — HOSPITAL ENCOUNTER (EMERGENCY)
Facility: HOSPITAL | Age: 34
Discharge: HOME | End: 2024-03-22
Attending: STUDENT IN AN ORGANIZED HEALTH CARE EDUCATION/TRAINING PROGRAM
Payer: MEDICAID

## 2024-03-20 DIAGNOSIS — R45.850 HOMICIDAL IDEATION: Primary | ICD-10-CM

## 2024-03-20 LAB
ALBUMIN SERPL-MCNC: 3.5 G/DL (ref 3.5–5)
ALBUMIN/GLOB SERPL: 0.7 (ref 1.1–2.2)
ALP SERPL-CCNC: 84 U/L (ref 45–117)
ALT SERPL-CCNC: 26 U/L (ref 12–78)
ANION GAP SERPL CALC-SCNC: 3 MMOL/L (ref 5–15)
APAP SERPL-MCNC: <2 UG/ML (ref 10–30)
AST SERPL-CCNC: 19 U/L (ref 15–37)
BASOPHILS # BLD: 0 K/UL (ref 0–0.1)
BASOPHILS NFR BLD: 0 % (ref 0–1)
BILIRUB SERPL-MCNC: 0.3 MG/DL (ref 0.2–1)
BUN SERPL-MCNC: 14 MG/DL (ref 6–20)
BUN/CREAT SERPL: 13 (ref 12–20)
CALCIUM SERPL-MCNC: 9.1 MG/DL (ref 8.5–10.1)
CHLORIDE SERPL-SCNC: 105 MMOL/L (ref 97–108)
CO2 SERPL-SCNC: 31 MMOL/L (ref 21–32)
COMMENT:: NORMAL
CREAT SERPL-MCNC: 1.05 MG/DL (ref 0.55–1.02)
DIFFERENTIAL METHOD BLD: ABNORMAL
EOSINOPHIL # BLD: 0.1 K/UL (ref 0–0.4)
EOSINOPHIL NFR BLD: 1 % (ref 0–7)
ERYTHROCYTE [DISTWIDTH] IN BLOOD BY AUTOMATED COUNT: 14.2 % (ref 11.5–14.5)
ETHANOL SERPL-MCNC: <10 MG/DL (ref 0–0.08)
GLOBULIN SER CALC-MCNC: 5.3 G/DL (ref 2–4)
GLUCOSE SERPL-MCNC: 93 MG/DL (ref 65–100)
HCT VFR BLD AUTO: 42.1 % (ref 35–47)
HGB BLD-MCNC: 12.9 G/DL (ref 11.5–16)
IMM GRANULOCYTES # BLD AUTO: 0.2 K/UL (ref 0–0.04)
IMM GRANULOCYTES NFR BLD AUTO: 2 % (ref 0–0.5)
LYMPHOCYTES # BLD: 1.7 K/UL (ref 0.8–3.5)
LYMPHOCYTES NFR BLD: 21 % (ref 12–49)
MCH RBC QN AUTO: 24 PG (ref 26–34)
MCHC RBC AUTO-ENTMCNC: 30.6 G/DL (ref 30–36.5)
MCV RBC AUTO: 78.3 FL (ref 80–99)
MONOCYTES # BLD: 0.7 K/UL (ref 0–1)
MONOCYTES NFR BLD: 9 % (ref 5–13)
NEUTS SEG # BLD: 5.3 K/UL (ref 1.8–8)
NEUTS SEG NFR BLD: 67 % (ref 32–75)
NRBC # BLD: 0 K/UL (ref 0–0.01)
NRBC BLD-RTO: 0 PER 100 WBC
PLATELET # BLD AUTO: 221 K/UL (ref 150–400)
PMV BLD AUTO: 10 FL (ref 8.9–12.9)
POTASSIUM SERPL-SCNC: 3.4 MMOL/L (ref 3.5–5.1)
PROT SERPL-MCNC: 8.8 G/DL (ref 6.4–8.2)
RBC # BLD AUTO: 5.38 M/UL (ref 3.8–5.2)
SALICYLATES SERPL-MCNC: <1.7 MG/DL (ref 2.8–20)
SODIUM SERPL-SCNC: 139 MMOL/L (ref 136–145)
SPECIMEN HOLD: NORMAL
WBC # BLD AUTO: 8 K/UL (ref 3.6–11)

## 2024-03-20 PROCEDURE — 99284 EMERGENCY DEPT VISIT MOD MDM: CPT

## 2024-03-20 PROCEDURE — 82077 ASSAY SPEC XCP UR&BREATH IA: CPT

## 2024-03-20 PROCEDURE — 90791 PSYCH DIAGNOSTIC EVALUATION: CPT

## 2024-03-20 PROCEDURE — 36415 COLL VENOUS BLD VENIPUNCTURE: CPT

## 2024-03-20 PROCEDURE — 80179 DRUG ASSAY SALICYLATE: CPT

## 2024-03-20 PROCEDURE — 80143 DRUG ASSAY ACETAMINOPHEN: CPT

## 2024-03-20 PROCEDURE — 80053 COMPREHEN METABOLIC PANEL: CPT

## 2024-03-20 PROCEDURE — 85025 COMPLETE CBC W/AUTO DIFF WBC: CPT

## 2024-03-20 ASSESSMENT — ENCOUNTER SYMPTOMS
ABDOMINAL PAIN: 0
SHORTNESS OF BREATH: 0

## 2024-03-20 ASSESSMENT — PAIN - FUNCTIONAL ASSESSMENT: PAIN_FUNCTIONAL_ASSESSMENT: NONE - DENIES PAIN

## 2024-03-20 NOTE — ED PROVIDER NOTES
TABLET    Take by mouth every 6 hours as needed       ALLERGIES     Patient has no allergy information on record.    FAMILY HISTORY     No family history on file.       SOCIAL HISTORY       Social History     Socioeconomic History    Marital status: Single   Tobacco Use    Smoking status: Never    Smokeless tobacco: Never   Substance and Sexual Activity    Alcohol use: No    Drug use: No   Social History Narrative    ** Merged History Encounter **                PHYSICAL EXAM    (up to 7 for level 4, 8 or more for level 5)     ED Triage Vitals [03/20/24 1716]   BP Temp Temp Source Pulse Respirations SpO2 Height Weight   120/79 98.9 °F (37.2 °C) Temporal (!) 113 18 94 % -- --       There is no height or weight on file to calculate BMI.    Physical Exam  Vitals and nursing note reviewed.   Constitutional:       General: She is not in acute distress.     Appearance: She is normal weight.   HENT:      Head: Normocephalic.      Right Ear: External ear normal.      Left Ear: External ear normal.      Nose: Nose normal.   Eyes:      Conjunctiva/sclera: Conjunctivae normal.   Cardiovascular:      Rate and Rhythm: Normal rate and regular rhythm.      Pulses: Normal pulses.      Heart sounds: Normal heart sounds. No murmur heard.  Pulmonary:      Effort: Pulmonary effort is normal. No respiratory distress.      Breath sounds: Normal breath sounds.      Comments: 2LNC  Abdominal:      General: Abdomen is flat. Bowel sounds are normal.      Tenderness: There is no abdominal tenderness.   Musculoskeletal:      Right lower leg: No edema.      Left lower leg: No edema.   Skin:     General: Skin is warm.      Capillary Refill: Capillary refill takes less than 2 seconds.   Neurological:      General: No focal deficit present.      Mental Status: She is alert.   Psychiatric:      Comments: Homicidal         DIAGNOSTIC RESULTS     EKG: All EKG's are interpreted by the Emergency Department Physician who either signs or Co-signs this  chart in the absence of a cardiologist.        RADIOLOGY:   Non-plain film images such as CT, Ultrasound and MRI are read by the radiologist. Plain radiographic images are visualized and preliminarily interpreted by the emergency physician with the below findings:        Interpretation per the Radiologist below, if available at the time of this note:    No orders to display        LABS:  Labs Reviewed   CBC WITH AUTO DIFFERENTIAL - Abnormal; Notable for the following components:       Result Value    RBC 5.38 (*)     MCV 78.3 (*)     MCH 24.0 (*)     Immature Granulocytes 2 (*)     Absolute Immature Granulocyte 0.2 (*)     All other components within normal limits   COVID-19, RAPID   EXTRA TUBES HOLD   COMPREHENSIVE METABOLIC PANEL   SALICYLATE LEVEL   ACETAMINOPHEN LEVEL   URINE DRUG SCREEN   ETHANOL   POC PREGNANCY UR-QUAL       All other labs were within normal range or not returned as of this dictation.    EMERGENCY DEPARTMENT COURSE and DIFFERENTIAL DIAGNOSIS/MDM:   Vitals:    Vitals:    03/20/24 1716   BP: 120/79   Pulse: (!) 113   Resp: 18   Temp: 98.9 °F (37.2 °C)   TempSrc: Temporal   SpO2: 94%           Medical Decision Making  Patient here for homicidal ideation, with law enforcement, vital signs show that she is slightly tachycardic, she is not hypoxic or tachypneic or febrile.  Has homicidal ideation and plan.  Will have the patient evaluated by BSMART, differential includes psychiatric condition, electrolyte disturbance, encephalopathy    Amount and/or Complexity of Data Reviewed  Labs: ordered.  ECG/medicine tests: ordered.            REASSESSMENT        Change of shift. Care of patient taken over Dr. Dexter; H&P reviewed, handoff complete.    CONSULTS:  IP CONSULT TO BSMART    PROCEDURES:  Unless otherwise noted below, none     Procedures      FINAL IMPRESSION      1. Homicidal ideation          DISPOSITION/PLAN   DISPOSITION        PATIENT REFERRED TO:  No follow-up provider specified.    DISCHARGE

## 2024-03-20 NOTE — ED TRIAGE NOTES
Patient presents from home with HPD as an ECO. Patient has HI thoughts towards her children's father. Patient has Sarcoidosis and wears 2 L NC at baseline

## 2024-03-21 RX ORDER — HYDROXYZINE HYDROCHLORIDE 25 MG/1
25 TABLET, FILM COATED ORAL 3 TIMES DAILY PRN
Status: DISCONTINUED | OUTPATIENT
Start: 2024-03-21 | End: 2024-03-22 | Stop reason: HOSPADM

## 2024-03-21 RX ORDER — TRAZODONE HYDROCHLORIDE 50 MG/1
50 TABLET ORAL NIGHTLY PRN
Status: DISCONTINUED | OUTPATIENT
Start: 2024-03-21 | End: 2024-03-22 | Stop reason: HOSPADM

## 2024-03-21 ASSESSMENT — PAIN - FUNCTIONAL ASSESSMENT: PAIN_FUNCTIONAL_ASSESSMENT: 0-10

## 2024-03-21 ASSESSMENT — PAIN SCALES - GENERAL: PAINLEVEL_OUTOF10: 0

## 2024-03-21 NOTE — ED NOTES
Pt still refusing blood work. States \"I already told y'all I don't do drugs. I don't even know why I am even still here.\" RN attempted to educate patient on process and reason blood work and urine is obtained. Pt still refusing.

## 2024-03-21 NOTE — ED NOTES
This RN spoke w/ Felipa to clarify that bed search is still going on for pt. Pt continues to tell staff she is allowed to go home if bed is not found- RN confirmed w/ Felipa this is not true.

## 2024-03-21 NOTE — ED NOTES
This RN placed pts belongings in appropriate locker with pt label. Pt refusing to be in green gown and morning VS - pt aggressive and agitated.

## 2024-03-21 NOTE — CONSULTS
[facility]  PSYCHIATRY CONSULT NOTE:    Name: Loan Hernandez  MR#: [id number]  : 1990  ACCOUNT#: [id number]  ADMIT DATE: 3/20/2024    REASON FOR CONSULT: HI/TDO     HISTORY OF PRESENTING COMPLAINT:  Loan Hernandez is a 33 y.o. female with PMH of sarcoidosis, oxygen dependent, on lung transplant list admitted after expressing homicidal ideation to one of the nurses at the [facility] yesterday in regards of her children's father. Her pulmonologist is Dr. Juan C Diana at [facility]. She is medically compromised and planning for a lung transplant after she gets her weight down to 190's (has lost \"a lot already gotten to 216\") a single mother of 3 children and their father has not been supportive financially or otherwise. She reports, \"I snapped, I didn't mean it\". She has also been placed on steroids recently. She denies any history of behavioral health diagnosis or treatment of any kind. She is currently seen in the ED today alongside [employer]  Leona. Loan has a 1:1 sitting at bedside and is handcuffed to the stretcher. She is addiment about not really \"meaning\" to harm anyone but does not recognize the seriousness of her statements. She is frustrated and agitated and believes if she agrees to some testing and is medically cleared that she will be \"sent upstairs\" and not have a chance of getting to go home. Both myself and Leona attempted to clarify her status and course of TDO and if she is upstairs or held up in the ER would not alter the judges decision but refusing care could potentially hold her process/stay. She does agree that due to such high stressors she plans and would like to start seeing a therapist outpatient.. Since her arrival yesterday she reports poor sleep and no appetite due to her anxiety and stress.   She denies any thoughts of suicide, homicide, or having audiovisual hallucinations with me today.     PAST PSYCHIATRIC HISTORY: Denies    SUBSTANCE ABUSE HISTORY:  Denies    PSYCHOSOCIAL HISTORY: Single mother of 3 children (unsupportive father of her children), awaiting lung transplant pending success of weight loss with a goal in the 190s (she has gotten to 216 reports doing well)    MENTAL STATUS EXAM:   Loan Hernandez is a 33 y.o. female who appears his/her stated age. She appears tired and slightly disheveled with an agitated and anxious but cooperative demeanor. She cooperates with assessment questions and makes fair eye contact.  Her speech is normal in rate, tone, and volume. Her self-reported mood is \"frustrated\" with similar affect. She denies auditory and visual hallucinations. No paranoia or delusions are elicited with assessment. Her thought processes are linear and goal-directed. She denies suicidal and homicidal ideation. She is alert and oriented X 4. Her memory appears intact as evidenced by conversation/answers to my questions. Insight and judgment are limited/poor due to limited understanding of gravity/seriousness of statements previously made that caused her TDO status.    DIAGNOSTIC IMPRESSION:    Acute stress disorder   Adjustment disorder       ASSESSMENT/PLAN:     Add hydroxazine as needed for anxiety   Trazadone as needed for sleep      TDO status:   Likely will be seen tomorrow?     If she is d/c follow up with therapist and evaluation with outpatient psychiatry     Psychiatry will follow during her stay      Thank you for the opportunity to participate in the care of your patient. Please re-consult psychiatry as needed.

## 2024-03-21 NOTE — BSMART NOTE
Initial BSMART Liaison Assessment Form     Section I - Integrated Summary    Chief Complaint: HI.    LOS:  0     Psychiatric Consult: ER hold    Presenting problem/Summary:  TDO served 3/20/24 @ 23:15 (expires 3/23/24)  Per triage, Patient presents from home with HPD as an ECO. Patient has HI thoughts towards her children's father. Patient has Sarcoidosis and wears 2 L NC at baseline      Pt is undergoing evaluation for possible lung transplant at [facility].     Sweta met with pt, FTF, in the ED.  She is received resting in bed, L-wrist cuffed to bed rail and sitter at bedside.  Pt is alert, oriented, logical and goal-directed.  Her affect is mildly blunted with occasional smiles.  Pt is calm, cooperative, poor insight regarding her current situation.  Pt denies current SI/HI/AVH.  Pt reports no mental health history.  Pt reports she is frustrated and irritated because she feels she was set up and no one is listening to her.  Pt reports significant stressors.  Pt states she was at [facility], last week, for shortness of breath and discharged home to find out her Trilogy machine had been denied by insurance.  Pt reports she was so stressed out from her medical issues (needs lung transplant), being in the hospital and coordinating someone to watch her 3 children, and the lack of support from her children's father, that she \"broke\". She states she was talking to a nurse from [facility] for a DC follow-up call and was asked if she was suicidal or had feelings of wanting to hurt others.  Pt states, \"At that moment, I was so stressed out and angry at my kid's father that I said what I said\".  Pt reports the RN from [facility] told her that Goldie Salazar would be coming to her home to help her.      Pt states, when Goldie got to her house, everything went downhill and they started treating her like she was a criminal.  Pt reports she called her sisters to come to the house for support and that made it even worse because they were emotional and

## 2024-03-21 NOTE — BSMART NOTE
Yeny, with [facility], reports TDO bed search continues and she will keep Liaison updated on progress.

## 2024-03-21 NOTE — ED NOTES
This RN spoke w/ [facility] and [facility]

## 2024-03-21 NOTE — ED NOTES
Care assumed from Dr. Smith, 33-year-old female presenting as now temporary halfway order, for homicidal ideation, history of sarcoidosis, and oxygen dependent, pending placement.    SIGN OUT:  11:00 PM  Discussed pt's hx, disposition, and available diagnostic and imaging results with Dr. Jenkins. Reviewed care plans. Both providers and patient are in agreement with care plan. Dr. Sinclair is transferring care of the pt to Dr. Jenkins at this time.        Venkat Sinclair MD  03/21/24 5995

## 2024-03-21 NOTE — ED NOTES
Pt continuing to refuse to give urine sample or let staff obtain EKG. RN explained to pt lack of results causes delaying pt process of bed search. Pt states, \"okay\" and RN asks pt if she has any questions but pt states she does not.

## 2024-03-21 NOTE — ED PROVIDER NOTES
This patient was signed over to me by Dr. Yoo pending bed search for psychiatric admission.  Patient has been stable during my shift and has not required any intervention.  Bed search is still continuing and the patient is turned over to Dr. Sinclair at change of shift in no acute distress     Leonardo Smith MD  03/21/24 9465

## 2024-03-21 NOTE — ED NOTES
Pt continuing to refuse to give urine sample or let staff obtain EKG. RN explained to pt lack of results causes delaying pt process of bed search. Pt states, \"okay\" and RN asks pt if she has any questions but pt states she does not. Pt also continuing to put on hospital gown and give staff her belongings to secure in  locker.

## 2024-03-22 VITALS
RESPIRATION RATE: 16 BRPM | HEART RATE: 100 BPM | TEMPERATURE: 98.7 F | SYSTOLIC BLOOD PRESSURE: 117 MMHG | OXYGEN SATURATION: 94 % | DIASTOLIC BLOOD PRESSURE: 75 MMHG

## 2024-03-22 NOTE — ED NOTES
7:00 AM  Change of shift. Care of patient taken over from Dr. Jenkins. Patient is under TDO and pending psychiatric placement.    Patient had a repeat hearing this morning, cleared and no longer on TDO.    I reevaluated the patient she denies SI, HI, AH or VH.  She feels safe being discharged home.  Patient will be discharged with outpatient mental health resources.  ER return precautions given.     Elmira Gibbs,   03/22/24 1420

## 2024-03-22 NOTE — ED NOTES
[facility] to release patient from ER as TDO. RN spoke to [facility] to verify that patient was indeed ok to leave ER. [facility] verified to RN patient was safe to be released.

## 2024-03-22 NOTE — ED NOTES
Rn asks the pt if they are able provided a urine sample. Pt states they have already voided and do not want to give a urine sample today. Charge nurse is aware.

## 2024-03-22 NOTE — ED NOTES
Patient unable to find drivers licenses. Said the officer who brought her in has it. RN called non-emergency number to help track it down. Patient discharged at this time. Refused vitals.    RN spoke to non-emergency staff. Was told that officer is not on shift, but would forward the message to return patients ID. RN left patients phone number with non-emergency so they can contact patient.

## 2024-03-22 NOTE — ED NOTES
0745: RN entered room to introduce self and obtain vitals. RN was informed from officer that patient is in the middle of a court hearing via officers laptop. Patient in forensic restraints to left wrist. Skin intact. Patient able to perform ROM.  0900: RN was able to get vitals. Patient no longer in forensic restraints. Skin in tact and able to perform ROM. Officer told RN that patient is no longer a TDO and is awaiting discharge.

## 2024-03-22 NOTE — ED NOTES
Rn asked pt if we could get an EKG and a urine sample. Pt refuses and states we can try tomorrow.

## 2024-03-22 NOTE — BSMART NOTE
Pt was released at her TDO hearing, this morning.  Liaison met with pt, briefly, to provide resources for mental health skill building, counseling, and outpatient medication mgmt.  Pt's sister will provide transportation home.

## 2024-03-22 NOTE — ED PROVIDER NOTES
11:18 PM   Loan Hernandez is a 33 y.o. female awaiting placement in a psychiatric facility with a diagnosis of   1. Homicidal ideation    . The patient was reexamined and remains clinically stable. All needs are being met at this time. All questions from the patient and/ or family were answered. The patient will continue to be reassessed intermittently until transfer. Patient signed out to me by Dr. Sinclair. Patient is under TDO.     Patient Vitals for the past 24 hrs:   BP Temp Temp src Pulse Resp SpO2   03/21/24 2145 (!) 125/92 97.7 °F (36.5 °C) Oral 82 15 96 %   03/21/24 1321 -- -- -- 86 -- 100 %       Will Jenkins DO     0700  Change of shift. Care of patient turned over to Dr. Gibbs;      Will Jenkins,   03/22/24 7852